# Patient Record
Sex: FEMALE | Race: ASIAN | NOT HISPANIC OR LATINO
[De-identification: names, ages, dates, MRNs, and addresses within clinical notes are randomized per-mention and may not be internally consistent; named-entity substitution may affect disease eponyms.]

---

## 2017-06-14 ENCOUNTER — RESULT REVIEW (OUTPATIENT)
Age: 62
End: 2017-06-14

## 2017-11-09 ENCOUNTER — OUTPATIENT (OUTPATIENT)
Dept: OUTPATIENT SERVICES | Facility: HOSPITAL | Age: 62
LOS: 1 days | End: 2017-11-09
Payer: COMMERCIAL

## 2017-11-09 VITALS
HEIGHT: 66 IN | TEMPERATURE: 98 F | HEART RATE: 58 BPM | WEIGHT: 139.99 LBS | OXYGEN SATURATION: 100 % | RESPIRATION RATE: 16 BRPM | DIASTOLIC BLOOD PRESSURE: 74 MMHG | SYSTOLIC BLOOD PRESSURE: 115 MMHG

## 2017-11-09 DIAGNOSIS — Z98.890 OTHER SPECIFIED POSTPROCEDURAL STATES: Chronic | ICD-10-CM

## 2017-11-09 DIAGNOSIS — Z01.818 ENCOUNTER FOR OTHER PREPROCEDURAL EXAMINATION: ICD-10-CM

## 2017-11-09 DIAGNOSIS — Z90.49 ACQUIRED ABSENCE OF OTHER SPECIFIED PARTS OF DIGESTIVE TRACT: Chronic | ICD-10-CM

## 2017-11-09 DIAGNOSIS — M25.562 PAIN IN LEFT KNEE: ICD-10-CM

## 2017-11-09 PROCEDURE — G0463: CPT

## 2017-11-09 NOTE — H&P PST ADULT - FAMILY HISTORY
Father  Still living? Unknown  Family history of pulmonary fibrosis, Age at diagnosis: Age Unknown  Family history of brain aneurysm, Age at diagnosis: Age Unknown     Mother  Still living? Unknown  Family history of hypertension, Age at diagnosis: Age Unknown  Family history of stroke, Age at diagnosis: Age Unknown  Family history of diabetes mellitus, Age at diagnosis: Age Unknown     Sibling  Still living? Unknown  Family history of breast cancer, Age at diagnosis: Age Unknown     Grandparent  Still living? Unknown  Family history of breast cancer, Age at diagnosis: Age Unknown

## 2017-11-09 NOTE — H&P PST ADULT - NSANTHOSAYNRD_GEN_A_CORE
No. YULIANA screening performed.  STOP BANG Legend: 0-2 = LOW Risk; 3-4 = INTERMEDIATE Risk; 5-8 = HIGH Risk

## 2017-11-09 NOTE — H&P PST ADULT - HISTORY OF PRESENT ILLNESS
62year old female with pmhx of appendicitis, hypertension, hyperlipidemia and rheumatoid arthritis presents with c/o pain to left knee x 2months that has increased in intensity. Patient attributes worsening pain to wear/tear and rheumatoid arthritis. Pain is rated an 8/10 with activity and 5/10 with rest. Patient is here today for presurgical testing for scheduled left knee arthroscopy on 11/17/17

## 2017-11-16 ENCOUNTER — TRANSCRIPTION ENCOUNTER (OUTPATIENT)
Age: 62
End: 2017-11-16

## 2017-11-17 ENCOUNTER — RESULT REVIEW (OUTPATIENT)
Age: 62
End: 2017-11-17

## 2017-11-17 ENCOUNTER — OUTPATIENT (OUTPATIENT)
Dept: OUTPATIENT SERVICES | Facility: HOSPITAL | Age: 62
LOS: 1 days | End: 2017-11-17
Payer: COMMERCIAL

## 2017-11-17 VITALS
TEMPERATURE: 98 F | DIASTOLIC BLOOD PRESSURE: 61 MMHG | OXYGEN SATURATION: 99 % | RESPIRATION RATE: 16 BRPM | HEART RATE: 71 BPM | SYSTOLIC BLOOD PRESSURE: 110 MMHG

## 2017-11-17 VITALS
WEIGHT: 139.99 LBS | OXYGEN SATURATION: 99 % | HEIGHT: 66 IN | RESPIRATION RATE: 18 BRPM | HEART RATE: 64 BPM | TEMPERATURE: 98 F | SYSTOLIC BLOOD PRESSURE: 110 MMHG | DIASTOLIC BLOOD PRESSURE: 78 MMHG

## 2017-11-17 DIAGNOSIS — Z90.49 ACQUIRED ABSENCE OF OTHER SPECIFIED PARTS OF DIGESTIVE TRACT: Chronic | ICD-10-CM

## 2017-11-17 DIAGNOSIS — M25.562 PAIN IN LEFT KNEE: ICD-10-CM

## 2017-11-17 DIAGNOSIS — Z98.890 OTHER SPECIFIED POSTPROCEDURAL STATES: Chronic | ICD-10-CM

## 2017-11-17 PROCEDURE — 29886 ARTHRS KNEE SURG DRLG OD LES: CPT | Mod: LT

## 2017-11-17 PROCEDURE — 29880 ARTHRS KNE SRG MNISECTMY M&L: CPT | Mod: LT

## 2017-11-17 PROCEDURE — 88304 TISSUE EXAM BY PATHOLOGIST: CPT | Mod: 26

## 2017-11-17 PROCEDURE — 88304 TISSUE EXAM BY PATHOLOGIST: CPT

## 2017-11-17 RX ORDER — ONDANSETRON 8 MG/1
4 TABLET, FILM COATED ORAL ONCE
Refills: 0 | Status: DISCONTINUED | OUTPATIENT
Start: 2017-11-17 | End: 2017-11-25

## 2017-11-17 RX ORDER — SODIUM CHLORIDE 9 MG/ML
3 INJECTION INTRAMUSCULAR; INTRAVENOUS; SUBCUTANEOUS EVERY 8 HOURS
Refills: 0 | Status: DISCONTINUED | OUTPATIENT
Start: 2017-11-17 | End: 2017-11-25

## 2017-11-17 RX ORDER — SODIUM CHLORIDE 9 MG/ML
1000 INJECTION, SOLUTION INTRAVENOUS
Refills: 0 | Status: DISCONTINUED | OUTPATIENT
Start: 2017-11-17 | End: 2017-11-20

## 2017-11-17 RX ORDER — SODIUM CHLORIDE 9 MG/ML
1000 INJECTION, SOLUTION INTRAVENOUS
Refills: 0 | Status: DISCONTINUED | OUTPATIENT
Start: 2017-11-17 | End: 2017-11-25

## 2017-11-17 RX ORDER — FENTANYL CITRATE 50 UG/ML
25 INJECTION INTRAVENOUS
Refills: 0 | Status: DISCONTINUED | OUTPATIENT
Start: 2017-11-17 | End: 2017-11-20

## 2017-11-17 RX ORDER — TRAMADOL HYDROCHLORIDE 50 MG/1
1 TABLET ORAL
Qty: 40 | Refills: 0
Start: 2017-11-17

## 2017-11-17 RX ORDER — TRAMADOL HYDROCHLORIDE 50 MG/1
50 TABLET ORAL EVERY 6 HOURS
Refills: 0 | Status: DISCONTINUED | OUTPATIENT
Start: 2017-11-17 | End: 2017-11-17

## 2017-11-17 RX ORDER — ONDANSETRON 8 MG/1
4 TABLET, FILM COATED ORAL ONCE
Refills: 0 | Status: COMPLETED | OUTPATIENT
Start: 2017-11-17 | End: 2017-11-17

## 2017-11-17 RX ADMIN — FENTANYL CITRATE 25 MICROGRAM(S): 50 INJECTION INTRAVENOUS at 20:00

## 2017-11-17 RX ADMIN — FENTANYL CITRATE 25 MICROGRAM(S): 50 INJECTION INTRAVENOUS at 19:36

## 2017-11-17 RX ADMIN — ONDANSETRON 4 MILLIGRAM(S): 8 TABLET, FILM COATED ORAL at 19:36

## 2017-11-17 NOTE — BRIEF OPERATIVE NOTE - PROCEDURE
<<-----Click on this checkbox to enter Procedure Knee arthroscopy, left  11/17/2017    Active  [id number]

## 2017-11-17 NOTE — ASU DISCHARGE PLAN (ADULT/PEDIATRIC). - MEDICATION SUMMARY - MEDICATIONS TO TAKE
I will START or STAY ON the medications listed below when I get home from the hospital:    traMADol 50 mg oral tablet  -- 1 tab(s) by mouth every 6 hours, As needed, for  Pain MDD:4   -- Indication: For Left knee pain

## 2017-11-24 LAB — SURGICAL PATHOLOGY FINAL REPORT - CH: SIGNIFICANT CHANGE UP

## 2020-01-29 NOTE — ASU PREOP CHECKLIST - LATEX ALLERGY
1. Continue with your good diet.   2. Continue with present meds.   3. Get back to exercising as your foot improves.   4. Try to avoid sweets all the time.   5. Return in mid to late July for repeat fasting blood work and follow up.     
no

## 2021-08-11 NOTE — H&P PST ADULT - NEGATIVE PSYCHIATRIC SYMPTOMS
Problem: Potential for injury, Restraints  Goal: # Remains free from injury  Outcome: Outcome Not Met, Continue to Monitor  Goal: Verbalizes criteria for restraint discontinuation  Description: Document on Patient Education Activity  Outcome: Outcome Not Met, Continue to Monitor     Problem: Pain  Goal: #Acceptable pain level achieved/maintained at rest using NRS/Faces  Description: This goal is used for patients who can self-report.  Acceptable means the level is at or below the identified comfort/function goal.  Outcome: Outcome Not Met, Continue to Monitor  Goal: # Acceptable pain level achieved/maintained at rest using NRS/Faces without oversedation (opioid naive or PCA/Epidural infusion)  Description: This goal is used if Opioid-naïve or on PCA/Epidural Infusion.  Outcome: Outcome Not Met, Continue to Monitor  Goal: # Acceptable pain level achieved/maintained with activity using NRS/Faces  Description: This goal is used for patients who can self-report and are not achieving acceptable pain control during activity.  Outcome: Outcome Not Met, Continue to Monitor  Goal: Acceptable pain/comfort level is achieved/maintained at rest (based on Pain Behaviors Scale)  Description: This goal is used for patients who are not able to self-report pain and are assessed for pain using the Pain Behaviors Scale  Outcome: Outcome Not Met, Continue to Monitor  Goal: Acceptable pain/comfort level is achieved/maintained at rest based on PAINAID scale (Dementia)  Description: This goal is used for patients who are not able to self-report pain, have dementia, and assessed using the PAINAD scale.  Outcome: Outcome Not Met, Continue to Monitor  Goal: Acceptable pain/comfort level is achieved/maintained at rest (based on pediatric behavior tool: NIPS, NPASS, or FLACC)  Description: This goal is used for pediatric patients who are not able to self report pain.  Outcome: Outcome Not Met, Continue to Monitor  Goal: # Verbalizes  understanding of pain management  Description: Documented in Patient Education Activity  Outcome: Outcome Not Met, Continue to Monitor  Goal: Verbalizes understanding and effective use of Patient Controlled Analgesia (PCA)  Description: Documented in Patient Education Activity  This goal is used for patients with PCA  Outcome: Outcome Not Met, Continue to Monitor  Goal: Maximum comfort achieved/maintained at end of life (Hospice)  Outcome: Outcome Not Met, Continue to Monitor     Problem: At Risk for Falls  Goal: # Patient does not fall  Outcome: Outcome Not Met, Continue to Monitor  Goal: # Takes action to control fall-related risks  Outcome: Outcome Not Met, Continue to Monitor  Goal: # Verbalizes understanding of fall risk/precautions  Description: Document education using the patient education activity  Outcome: Outcome Not Met, Continue to Monitor     Problem: At Risk for Injury Due to Fall  Goal: # Patient does not fall  Outcome: Outcome Not Met, Continue to Monitor  Goal: # Takes action to control condition specific risks  Outcome: Outcome Not Met, Continue to Monitor  Goal: # Verbalizes understanding of fall-related injury personal risks  Description: Document education using the patient education activity  Outcome: Outcome Not Met, Continue to Monitor     Problem: Artificial Airway Management  Goal: # Maintains effective artificial airway  Outcome: Outcome Not Met, Continue to Monitor  Goal: # Maintains skin integrity around the airway  Outcome: Outcome Not Met, Continue to Monitor  Goal: # Tolerates Activity/ADL without s/s of intolerance  Description: Monitor patient tolerance of the artificial airway while they perform activities and ADLs include bathing, showering, shaving, and eating.  Outcome: Outcome Not Met, Continue to Monitor  Goal: Verbalizes understanding of artifical airway function and care  Description: Document on Patient Education Activity  Outcome: Outcome Not Met, Continue to  Monitor  Goal: Demonstrates ability to manage Trach: site care, suctioning, trach barajas care & inner cannula care if needed.  Description: Document on Patient Education Activity    Outcome: Outcome Not Met, Continue to Monitor  Goal: Demonstrates ability to perform Trach cuff maintenance  Description: Document on Patient Education Activity    Outcome: Outcome Not Met, Continue to Monitor  Goal: Demonstrates ability to manage Laryngectomy: stoma care, suctioning, and humidification  Description: Document on Patient Education Activity  Outcome: Outcome Not Met, Continue to Monitor  Goal: Demonstrates ability to manage communication (speaking valve or cork insertion)  Description: Document on Patient Education Activity  Outcome: Outcome Not Met, Continue to Monitor     Problem: Pressure Injury, Risk for  Goal: # Skin remains intact  Outcome: Outcome Not Met, Continue to Monitor  Goal: No new pressure injury (PI) development  Outcome: Outcome Not Met, Continue to Monitor  Goal: # Verbalizes understanding of PI risk factors and prevention strategies  Description: Document education using the patient education activity.   Outcome: Outcome Not Met, Continue to Monitor  Goal: Comfort optimized with pressure injury prevention strategies guided by patient/family preference. (Hospice)  Outcome: Outcome Not Met, Continue to Monitor      no suicidal ideation/no depression/no anxiety/no insomnia/no memory loss

## 2022-04-20 ENCOUNTER — RESULT REVIEW (OUTPATIENT)
Age: 67
End: 2022-04-20

## 2022-04-25 RX ORDER — FOLIC ACID 0.8 MG
1 TABLET ORAL
Qty: 0 | Refills: 0 | DISCHARGE

## 2022-04-25 RX ORDER — ROSUVASTATIN CALCIUM 5 MG/1
1 TABLET ORAL
Qty: 0 | Refills: 0 | DISCHARGE

## 2022-04-25 RX ORDER — METHOTREXATE 2.5 MG/1
0 TABLET ORAL
Qty: 0 | Refills: 0 | DISCHARGE

## 2022-04-25 RX ORDER — HYDROXYCHLOROQUINE SULFATE 200 MG
1 TABLET ORAL
Qty: 0 | Refills: 0 | DISCHARGE

## 2022-04-25 RX ORDER — AMLODIPINE BESYLATE 2.5 MG/1
1 TABLET ORAL
Qty: 0 | Refills: 0 | DISCHARGE

## 2022-04-25 RX ORDER — ASPIRIN/CALCIUM CARB/MAGNESIUM 324 MG
1 TABLET ORAL
Qty: 0 | Refills: 0 | DISCHARGE

## 2022-05-12 PROBLEM — E78.5 HYPERLIPIDEMIA, UNSPECIFIED: Chronic | Status: ACTIVE | Noted: 2017-11-09

## 2022-05-12 PROBLEM — I10 ESSENTIAL (PRIMARY) HYPERTENSION: Chronic | Status: ACTIVE | Noted: 2017-11-09

## 2022-05-12 PROBLEM — M06.9 RHEUMATOID ARTHRITIS, UNSPECIFIED: Chronic | Status: ACTIVE | Noted: 2017-11-09

## 2022-05-12 PROBLEM — K37 UNSPECIFIED APPENDICITIS: Chronic | Status: ACTIVE | Noted: 2017-11-09

## 2022-05-18 ENCOUNTER — OUTPATIENT (OUTPATIENT)
Dept: OUTPATIENT SERVICES | Facility: HOSPITAL | Age: 67
LOS: 1 days | End: 2022-05-18
Payer: MEDICARE

## 2022-05-18 VITALS
DIASTOLIC BLOOD PRESSURE: 62 MMHG | RESPIRATION RATE: 16 BRPM | TEMPERATURE: 97 F | SYSTOLIC BLOOD PRESSURE: 116 MMHG | HEART RATE: 56 BPM | HEIGHT: 64 IN | OXYGEN SATURATION: 100 % | WEIGHT: 135.58 LBS

## 2022-05-18 DIAGNOSIS — D05.12 INTRADUCTAL CARCINOMA IN SITU OF LEFT BREAST: ICD-10-CM

## 2022-05-18 DIAGNOSIS — Z98.890 OTHER SPECIFIED POSTPROCEDURAL STATES: Chronic | ICD-10-CM

## 2022-05-18 DIAGNOSIS — Z90.49 ACQUIRED ABSENCE OF OTHER SPECIFIED PARTS OF DIGESTIVE TRACT: Chronic | ICD-10-CM

## 2022-05-18 DIAGNOSIS — R92.2 INCONCLUSIVE MAMMOGRAM: ICD-10-CM

## 2022-05-18 DIAGNOSIS — Z01.818 ENCOUNTER FOR OTHER PREPROCEDURAL EXAMINATION: ICD-10-CM

## 2022-05-18 DIAGNOSIS — Z80.3 FAMILY HISTORY OF MALIGNANT NEOPLASM OF BREAST: ICD-10-CM

## 2022-05-18 DIAGNOSIS — Z96.652 PRESENCE OF LEFT ARTIFICIAL KNEE JOINT: Chronic | ICD-10-CM

## 2022-05-18 PROCEDURE — 36415 COLL VENOUS BLD VENIPUNCTURE: CPT

## 2022-05-18 PROCEDURE — 88321 CONSLTJ&REPRT SLD PREP ELSWR: CPT

## 2022-05-18 PROCEDURE — G0463: CPT

## 2022-05-18 RX ORDER — PANTOPRAZOLE SODIUM 20 MG/1
1 TABLET, DELAYED RELEASE ORAL
Qty: 0 | Refills: 0 | DISCHARGE

## 2022-05-18 NOTE — H&P PST ADULT - NSICDXPASTMEDICALHX_GEN_ALL_CORE_FT
PAST MEDICAL HISTORY:  Anemia     Appendicitis     Breast cyst, left     GERD (gastroesophageal reflux disease)     Hyperlipidemia     Hypertension     Intraductal carcinoma in situ of left breast     Rheumatoid arthritis

## 2022-05-18 NOTE — H&P PST ADULT - NSICDXFAMILYHX_GEN_ALL_CORE_FT
FAMILY HISTORY:  Father  Still living? Unknown  Family history of brain aneurysm, Age at diagnosis: Age Unknown  Family history of pulmonary fibrosis, Age at diagnosis: Age Unknown    Mother  Still living? Unknown  Family history of diabetes mellitus, Age at diagnosis: Age Unknown  Family history of hypertension, Age at diagnosis: Age Unknown  Family history of stroke, Age at diagnosis: Age Unknown    Sibling  Still living? Unknown  Family history of breast cancer, Age at diagnosis: Age Unknown    Grandparent  Still living? Unknown  Family history of breast cancer, Age at diagnosis: Age Unknown

## 2022-05-18 NOTE — H&P PST ADULT - FALL HARM RISK - UNIVERSAL INTERVENTIONS
Bed in lowest position, wheels locked, appropriate side rails in place/Call bell, personal items and telephone in reach/Instruct patient to call for assistance before getting out of bed or chair/Non-slip footwear when patient is out of bed/[place] to call system/Physically safe environment - no spills, clutter or unnecessary equipment/Purposeful Proactive Rounding/Room/bathroom lighting operational, light cord in reach

## 2022-05-18 NOTE — H&P PST ADULT - NSICDXPASTSURGICALHX_GEN_ALL_CORE_FT
PAST SURGICAL HISTORY:  H/O left breast biopsy     History of appendectomy     Total knee replacement status, left 2019

## 2022-05-18 NOTE — H&P PST ADULT - PROBLEM SELECTOR PLAN 1
Patient schedule for left breast wide excision. Covid, EKG done by PCP and medical clearance pending,

## 2022-05-18 NOTE — H&P PST ADULT - HISTORY OF PRESENT ILLNESS
65 y/o female presents for PST. Per patient she had routine mammo, sonogram and MRI done in 04/2022 and abnormality was not left breast. Per patient biopsy done and resulted intraductal carcinoma in situ of the left breast. Patient denies any hx of covid and stated that she is vaccinated.

## 2022-05-19 PROBLEM — D05.12 INTRADUCTAL CARCINOMA IN SITU OF LEFT BREAST: Chronic | Status: ACTIVE | Noted: 2022-05-18

## 2022-05-19 PROBLEM — D64.9 ANEMIA, UNSPECIFIED: Chronic | Status: ACTIVE | Noted: 2022-05-18

## 2022-05-19 PROBLEM — K21.9 GASTRO-ESOPHAGEAL REFLUX DISEASE WITHOUT ESOPHAGITIS: Chronic | Status: ACTIVE | Noted: 2022-05-18

## 2022-05-20 ENCOUNTER — OUTPATIENT (OUTPATIENT)
Dept: OUTPATIENT SERVICES | Facility: HOSPITAL | Age: 67
LOS: 1 days | End: 2022-05-20
Payer: MEDICARE

## 2022-05-20 ENCOUNTER — APPOINTMENT (OUTPATIENT)
Dept: MAMMOGRAPHY | Facility: IMAGING CENTER | Age: 67
End: 2022-05-20
Payer: MEDICARE

## 2022-05-20 DIAGNOSIS — Z96.652 PRESENCE OF LEFT ARTIFICIAL KNEE JOINT: Chronic | ICD-10-CM

## 2022-05-20 DIAGNOSIS — Z90.49 ACQUIRED ABSENCE OF OTHER SPECIFIED PARTS OF DIGESTIVE TRACT: Chronic | ICD-10-CM

## 2022-05-20 DIAGNOSIS — Z98.890 OTHER SPECIFIED POSTPROCEDURAL STATES: Chronic | ICD-10-CM

## 2022-05-20 DIAGNOSIS — Z00.8 ENCOUNTER FOR OTHER GENERAL EXAMINATION: ICD-10-CM

## 2022-05-20 PROCEDURE — C1739: CPT

## 2022-05-20 PROCEDURE — 19281 PERQ DEVICE BREAST 1ST IMAG: CPT | Mod: LT

## 2022-05-20 PROCEDURE — 19281 PERQ DEVICE BREAST 1ST IMAG: CPT

## 2022-05-23 LAB — SURGICAL PATHOLOGY STUDY: SIGNIFICANT CHANGE UP

## 2022-05-24 ENCOUNTER — TRANSCRIPTION ENCOUNTER (OUTPATIENT)
Age: 67
End: 2022-05-24

## 2022-05-25 ENCOUNTER — OUTPATIENT (OUTPATIENT)
Dept: OUTPATIENT SERVICES | Facility: HOSPITAL | Age: 67
LOS: 1 days | End: 2022-05-25
Payer: MEDICARE

## 2022-05-25 ENCOUNTER — RESULT REVIEW (OUTPATIENT)
Age: 67
End: 2022-05-25

## 2022-05-25 ENCOUNTER — TRANSCRIPTION ENCOUNTER (OUTPATIENT)
Age: 67
End: 2022-05-25

## 2022-05-25 VITALS
DIASTOLIC BLOOD PRESSURE: 68 MMHG | WEIGHT: 135.58 LBS | HEART RATE: 50 BPM | RESPIRATION RATE: 15 BRPM | OXYGEN SATURATION: 100 % | SYSTOLIC BLOOD PRESSURE: 119 MMHG | TEMPERATURE: 98 F | HEIGHT: 64 IN

## 2022-05-25 VITALS
RESPIRATION RATE: 18 BRPM | DIASTOLIC BLOOD PRESSURE: 54 MMHG | OXYGEN SATURATION: 99 % | SYSTOLIC BLOOD PRESSURE: 100 MMHG | TEMPERATURE: 97 F | HEART RATE: 61 BPM

## 2022-05-25 DIAGNOSIS — Z96.652 PRESENCE OF LEFT ARTIFICIAL KNEE JOINT: Chronic | ICD-10-CM

## 2022-05-25 DIAGNOSIS — D05.12 INTRADUCTAL CARCINOMA IN SITU OF LEFT BREAST: ICD-10-CM

## 2022-05-25 DIAGNOSIS — Z98.890 OTHER SPECIFIED POSTPROCEDURAL STATES: Chronic | ICD-10-CM

## 2022-05-25 DIAGNOSIS — Z90.49 ACQUIRED ABSENCE OF OTHER SPECIFIED PARTS OF DIGESTIVE TRACT: Chronic | ICD-10-CM

## 2022-05-25 DIAGNOSIS — Z80.3 FAMILY HISTORY OF MALIGNANT NEOPLASM OF BREAST: ICD-10-CM

## 2022-05-25 DIAGNOSIS — R92.2 INCONCLUSIVE MAMMOGRAM: ICD-10-CM

## 2022-05-25 PROCEDURE — 88307 TISSUE EXAM BY PATHOLOGIST: CPT

## 2022-05-25 PROCEDURE — 38900 IO MAP OF SENT LYMPH NODE: CPT | Mod: LT

## 2022-05-25 PROCEDURE — 88333 PATH CONSLTJ SURG CYTO XM 1: CPT

## 2022-05-25 PROCEDURE — 19301 PARTIAL MASTECTOMY: CPT | Mod: LT

## 2022-05-25 PROCEDURE — 88305 TISSUE EXAM BY PATHOLOGIST: CPT

## 2022-05-25 PROCEDURE — 88333 PATH CONSLTJ SURG CYTO XM 1: CPT | Mod: 26,59

## 2022-05-25 PROCEDURE — 76098 X-RAY EXAM SURGICAL SPECIMEN: CPT

## 2022-05-25 PROCEDURE — 88305 TISSUE EXAM BY PATHOLOGIST: CPT | Mod: 26

## 2022-05-25 PROCEDURE — 76098 X-RAY EXAM SURGICAL SPECIMEN: CPT | Mod: 26

## 2022-05-25 PROCEDURE — 38500 BIOPSY/REMOVAL LYMPH NODES: CPT | Mod: LT

## 2022-05-25 PROCEDURE — 88307 TISSUE EXAM BY PATHOLOGIST: CPT | Mod: 26

## 2022-05-25 RX ORDER — SODIUM CHLORIDE 9 MG/ML
1000 INJECTION, SOLUTION INTRAVENOUS
Refills: 0 | Status: DISCONTINUED | OUTPATIENT
Start: 2022-05-25 | End: 2022-05-25

## 2022-05-25 RX ORDER — IBUPROFEN 200 MG
2 TABLET ORAL
Qty: 0 | Refills: 0 | DISCHARGE

## 2022-05-25 RX ORDER — FENTANYL CITRATE 50 UG/ML
12.5 INJECTION INTRAVENOUS ONCE
Refills: 0 | Status: DISCONTINUED | OUTPATIENT
Start: 2022-05-25 | End: 2022-05-25

## 2022-05-25 RX ORDER — ONDANSETRON 8 MG/1
4 TABLET, FILM COATED ORAL ONCE
Refills: 0 | Status: DISCONTINUED | OUTPATIENT
Start: 2022-05-25 | End: 2022-05-25

## 2022-05-25 RX ORDER — TRAMADOL HYDROCHLORIDE 50 MG/1
50 TABLET ORAL ONCE
Refills: 0 | Status: DISCONTINUED | OUTPATIENT
Start: 2022-05-25 | End: 2022-05-25

## 2022-05-25 RX ORDER — FENTANYL CITRATE 50 UG/ML
25 INJECTION INTRAVENOUS ONCE
Refills: 0 | Status: DISCONTINUED | OUTPATIENT
Start: 2022-05-25 | End: 2022-05-25

## 2022-05-25 RX ADMIN — SODIUM CHLORIDE 50 MILLILITER(S): 9 INJECTION, SOLUTION INTRAVENOUS at 07:06

## 2022-05-25 NOTE — BRIEF OPERATIVE NOTE - NSICDXBRIEFPREOP_GEN_ALL_CORE_FT
PRE-OP DIAGNOSIS:  Ductal carcinoma in situ (DCIS) of left breast 25-May-2022 10:08:56  Lilibeth Jackman

## 2022-05-25 NOTE — ASU PATIENT PROFILE, ADULT - FALL HARM RISK - UNIVERSAL INTERVENTIONS
Bed in lowest position, wheels locked, appropriate side rails in place/Call bell, personal items and telephone in reach/Instruct patient to call for assistance before getting out of bed or chair/Non-slip footwear when patient is out of bed/[place] to call system/Physically safe environment - no spills, clutter or unnecessary equipment/Purposeful Proactive Rounding/Room/bathroom lighting operational, light cord in reach

## 2022-05-25 NOTE — BRIEF OPERATIVE NOTE - NSICDXBRIEFPROCEDURE_GEN_ALL_CORE_FT
PROCEDURES:  Open resection of left breast 25-May-2022 10:11:31 kervin  guided/left axillary sentinel node bx using blue dye technique Lilibeth Jackman

## 2022-05-25 NOTE — ASU DISCHARGE PLAN (ADULT/PEDIATRIC) - NS MD DC FALL RISK RISK
For information on Fall & Injury Prevention, visit: [email or web address] OR  [email or web address] OR  https://www.cdc.gov/steadi/patient.html

## 2022-05-25 NOTE — BRIEF OPERATIVE NOTE - NSICDXBRIEFPOSTOP_GEN_ALL_CORE_FT
POST-OP DIAGNOSIS:  Ductal carcinoma in situ (DCIS) of left breast 25-May-2022 10:09:12  Lilibeth Jackman  Ductal carcinoma in situ (DCIS) of left breast 25-May-2022 10:09:34  Lilibeth Jackman

## 2022-05-25 NOTE — ASU DISCHARGE PLAN (ADULT/PEDIATRIC) - ASU DC SPECIAL INSTRUCTIONSFT
Keep dressing dry and intact for 48 hours then remove outer dressing and may shower   ice pack to left breast as needed   Wear bra day and night for 2 weeks   Take pain medication prescribed by Dr Bell as needed   If pain mild , take tylenol only   Follow up with Dr Bell in 2 weeks , call office for appt.

## 2022-06-02 LAB — SURGICAL PATHOLOGY STUDY: SIGNIFICANT CHANGE UP

## 2022-06-14 ENCOUNTER — OUTPATIENT (OUTPATIENT)
Dept: OUTPATIENT SERVICES | Facility: HOSPITAL | Age: 67
LOS: 1 days | End: 2022-06-14
Payer: MEDICARE

## 2022-06-14 VITALS
RESPIRATION RATE: 14 BRPM | HEART RATE: 60 BPM | WEIGHT: 132.72 LBS | SYSTOLIC BLOOD PRESSURE: 144 MMHG | TEMPERATURE: 98 F | DIASTOLIC BLOOD PRESSURE: 71 MMHG | HEIGHT: 65 IN | OXYGEN SATURATION: 100 %

## 2022-06-14 DIAGNOSIS — I10 ESSENTIAL (PRIMARY) HYPERTENSION: ICD-10-CM

## 2022-06-14 DIAGNOSIS — D05.12 INTRADUCTAL CARCINOMA IN SITU OF LEFT BREAST: ICD-10-CM

## 2022-06-14 DIAGNOSIS — Z90.49 ACQUIRED ABSENCE OF OTHER SPECIFIED PARTS OF DIGESTIVE TRACT: Chronic | ICD-10-CM

## 2022-06-14 DIAGNOSIS — Z98.890 OTHER SPECIFIED POSTPROCEDURAL STATES: Chronic | ICD-10-CM

## 2022-06-14 DIAGNOSIS — M06.9 RHEUMATOID ARTHRITIS, UNSPECIFIED: ICD-10-CM

## 2022-06-14 DIAGNOSIS — Z01.818 ENCOUNTER FOR OTHER PREPROCEDURAL EXAMINATION: ICD-10-CM

## 2022-06-14 DIAGNOSIS — E78.5 HYPERLIPIDEMIA, UNSPECIFIED: ICD-10-CM

## 2022-06-14 DIAGNOSIS — Z96.652 PRESENCE OF LEFT ARTIFICIAL KNEE JOINT: Chronic | ICD-10-CM

## 2022-06-14 LAB — SARS-COV-2 RNA SPEC QL NAA+PROBE: SIGNIFICANT CHANGE UP

## 2022-06-14 PROCEDURE — U0003: CPT

## 2022-06-14 PROCEDURE — G0463: CPT

## 2022-06-14 PROCEDURE — U0005: CPT

## 2022-06-14 NOTE — H&P PST ADULT - MUSCULOSKELETAL
details… normal/ROM intact/normal gait/strength 5/5 bilateral upper extremities/strength 5/5 bilateral lower extremities

## 2022-06-14 NOTE — H&P PST ADULT - PROBLEM SELECTOR PLAN 1
Pre-op instructions given. Pt verbalized understanding  Chlorhexidine wash instructions given  M/C, labs +l ekg in chart   Pending: Covidpcr results, test done today

## 2022-06-14 NOTE — H&P PST ADULT - NSANTHOSAYNRD_GEN_A_CORE
neck 14inches/No. YULIANA screening performed.  STOP BANG Legend: 0-2 = LOW Risk; 3-4 = INTERMEDIATE Risk; 5-8 = HIGH Risk

## 2022-06-14 NOTE — H&P PST ADULT - NSICDXPASTSURGICALHX_GEN_ALL_CORE_FT
PAST SURGICAL HISTORY:  H/O left breast biopsy     History of appendectomy     S/P lumpectomy, left breast 5/25/2022    Total knee replacement status, left 2019

## 2022-06-14 NOTE — H&P PST ADULT - HISTORY OF PRESENT ILLNESS
65 y/o female with medical h/o HTN, HDL and RA, reports h/o Left breast Intraductal carcinoma, left breast, and underwent Left breast lumpectomy on 5/25/2022, she reports s/p surgery biopsy indicate new findings and further procedure was recommended. Pt presents today for PST + Covidpcr test for scheduled Left Breast Wide Resection on 6/17/2022

## 2022-06-14 NOTE — H&P PST ADULT - HAS THE PATIENT HAD A SIGNIFICANT CHANGE IN FUNCTIONAL STATUS DUE TO CVA, HEAD TRAUMA, ORTHOPEDIC TRAUMA/SURGERY, OR FALL, WITH THE WEEK PRIOR TO ADMISSION
[address] [facility]                 [address] [facility], [address]                          ELECTROENCEPHALOGRAM REPORT    PATIENT NAME: Roverto Romo                    :        1980  MED REC NO:   [id number]                              ROOM:  ACCOUNT NO:   [de-identified]                           ADMIT DATE: 2021  PROVIDER:     Karina Shields    DATE OF EE2021    TECHNICIAN:  Paola Mooney. HISTORY:  This is a 68-year-old female with episode of unresponsiveness. She was seen in ED on 2021 after having generalized tonic-clonic  seizure. She had a history of migraine headaches and head trauma at age  15. Family history of cerebral aneurysm. MEDICATIONS:  Include Zyrtec, Pepcid, Maxalt, vitamin D.    DESCRIPTION OF PROCEDURE:  Electrodes were applied using paste in  positions dictated by the international 10-20 system of placement. Reviewing montages included both referential and bipolar derivations. In addition to EEG data, EKG and eye movements were recorded. This is a  routine recording. This test was performed on 2021. DESCRIPTION OF ACTIVITIES:  At the onset of the study, the patient is  drowsy with background demonstrating 8 Hz 30-40 microvolt symmetric  posterior alpha rhythm which attenuated symmetrically with eye opening. Medium voltage theta activity noted occurring diffusely as the study  progresses. Photic stimulation did not induce posterior driving  responses. Hyperventilation is not performed. Electrocardiogram  montage revealed normal sinus rhythm. During drowsiness left mid  temporal slowing noted. Vertex sharp waves and K-complexes are noted  occurring symmetrically. Isolated sharp wave discharge noted with left  mid temporal slowing. This did not evolve into rhythmic electrographic  seizure activity.     ELECTRODIAGNOSTIC INTERPRETATION:  This is an abnormal EEG with isolated  sharp wave activity noted in left mid temporal region associated with  left temporal slowing. Associated muscle artifacts makes it  questionable. Clinical correlation is recommended.         Johanna Loyola    D: 04/08/2021 14:09:13       T: 04/08/2021 14:18:14     [email or web address]  Job#: [id number]     Doc#: [id number]    CC: no

## 2022-06-16 ENCOUNTER — TRANSCRIPTION ENCOUNTER (OUTPATIENT)
Age: 67
End: 2022-06-16

## 2022-06-17 ENCOUNTER — TRANSCRIPTION ENCOUNTER (OUTPATIENT)
Age: 67
End: 2022-06-17

## 2022-06-17 ENCOUNTER — OUTPATIENT (OUTPATIENT)
Dept: OUTPATIENT SERVICES | Facility: HOSPITAL | Age: 67
LOS: 1 days | End: 2022-06-17
Payer: MEDICARE

## 2022-06-17 ENCOUNTER — RESULT REVIEW (OUTPATIENT)
Age: 67
End: 2022-06-17

## 2022-06-17 VITALS
RESPIRATION RATE: 14 BRPM | TEMPERATURE: 99 F | DIASTOLIC BLOOD PRESSURE: 76 MMHG | OXYGEN SATURATION: 99 % | HEIGHT: 65 IN | WEIGHT: 132.72 LBS | HEART RATE: 52 BPM | SYSTOLIC BLOOD PRESSURE: 144 MMHG

## 2022-06-17 VITALS
TEMPERATURE: 97 F | HEART RATE: 63 BPM | DIASTOLIC BLOOD PRESSURE: 69 MMHG | SYSTOLIC BLOOD PRESSURE: 136 MMHG | OXYGEN SATURATION: 100 % | RESPIRATION RATE: 16 BRPM

## 2022-06-17 DIAGNOSIS — Z90.49 ACQUIRED ABSENCE OF OTHER SPECIFIED PARTS OF DIGESTIVE TRACT: Chronic | ICD-10-CM

## 2022-06-17 DIAGNOSIS — Z98.890 OTHER SPECIFIED POSTPROCEDURAL STATES: Chronic | ICD-10-CM

## 2022-06-17 DIAGNOSIS — Z96.652 PRESENCE OF LEFT ARTIFICIAL KNEE JOINT: Chronic | ICD-10-CM

## 2022-06-17 DIAGNOSIS — D05.12 INTRADUCTAL CARCINOMA IN SITU OF LEFT BREAST: ICD-10-CM

## 2022-06-17 PROCEDURE — 88307 TISSUE EXAM BY PATHOLOGIST: CPT | Mod: 26

## 2022-06-17 PROCEDURE — 88305 TISSUE EXAM BY PATHOLOGIST: CPT | Mod: 26

## 2022-06-17 PROCEDURE — 88305 TISSUE EXAM BY PATHOLOGIST: CPT

## 2022-06-17 PROCEDURE — 19301 PARTIAL MASTECTOMY: CPT | Mod: LT

## 2022-06-17 PROCEDURE — 88307 TISSUE EXAM BY PATHOLOGIST: CPT

## 2022-06-17 RX ORDER — SODIUM CHLORIDE 9 MG/ML
1000 INJECTION, SOLUTION INTRAVENOUS
Refills: 0 | Status: DISCONTINUED | OUTPATIENT
Start: 2022-06-17 | End: 2022-06-17

## 2022-06-17 RX ORDER — ACETAMINOPHEN 500 MG
650 TABLET ORAL ONCE
Refills: 0 | Status: COMPLETED | OUTPATIENT
Start: 2022-06-17 | End: 2022-06-17

## 2022-06-17 RX ORDER — SCOPALAMINE 1 MG/3D
1 PATCH, EXTENDED RELEASE TRANSDERMAL ONCE
Refills: 0 | Status: COMPLETED | OUTPATIENT
Start: 2022-06-17 | End: 2022-06-17

## 2022-06-17 RX ORDER — ONDANSETRON 8 MG/1
4 TABLET, FILM COATED ORAL ONCE
Refills: 0 | Status: COMPLETED | OUTPATIENT
Start: 2022-06-17 | End: 2022-06-17

## 2022-06-17 RX ORDER — HYDROMORPHONE HYDROCHLORIDE 2 MG/ML
0.5 INJECTION INTRAMUSCULAR; INTRAVENOUS; SUBCUTANEOUS
Refills: 0 | Status: DISCONTINUED | OUTPATIENT
Start: 2022-06-17 | End: 2022-06-17

## 2022-06-17 RX ORDER — HYDROMORPHONE HYDROCHLORIDE 2 MG/ML
1 INJECTION INTRAMUSCULAR; INTRAVENOUS; SUBCUTANEOUS
Refills: 0 | Status: DISCONTINUED | OUTPATIENT
Start: 2022-06-17 | End: 2022-06-17

## 2022-06-17 RX ORDER — OXYCODONE HYDROCHLORIDE 5 MG/1
5 TABLET ORAL ONCE
Refills: 0 | Status: DISCONTINUED | OUTPATIENT
Start: 2022-06-17 | End: 2022-07-01

## 2022-06-17 RX ORDER — ONDANSETRON 8 MG/1
4 TABLET, FILM COATED ORAL ONCE
Refills: 0 | Status: DISCONTINUED | OUTPATIENT
Start: 2022-06-17 | End: 2022-06-17

## 2022-06-17 RX ORDER — ONDANSETRON 8 MG/1
4 TABLET, FILM COATED ORAL ONCE
Refills: 0 | Status: DISCONTINUED | OUTPATIENT
Start: 2022-06-17 | End: 2022-07-01

## 2022-06-17 RX ADMIN — Medication 12.5 MILLIGRAM(S): at 18:33

## 2022-06-17 RX ADMIN — SCOPALAMINE 1 PATCH: 1 PATCH, EXTENDED RELEASE TRANSDERMAL at 18:31

## 2022-06-17 RX ADMIN — HYDROMORPHONE HYDROCHLORIDE 0.5 MILLIGRAM(S): 2 INJECTION INTRAMUSCULAR; INTRAVENOUS; SUBCUTANEOUS at 15:11

## 2022-06-17 RX ADMIN — SODIUM CHLORIDE 50 MILLILITER(S): 9 INJECTION, SOLUTION INTRAVENOUS at 11:47

## 2022-06-17 RX ADMIN — Medication 650 MILLIGRAM(S): at 16:20

## 2022-06-17 RX ADMIN — HYDROMORPHONE HYDROCHLORIDE 0.5 MILLIGRAM(S): 2 INJECTION INTRAMUSCULAR; INTRAVENOUS; SUBCUTANEOUS at 14:58

## 2022-06-17 RX ADMIN — ONDANSETRON 4 MILLIGRAM(S): 8 TABLET, FILM COATED ORAL at 16:20

## 2022-06-17 RX ADMIN — Medication 650 MILLIGRAM(S): at 16:30

## 2022-06-17 NOTE — ASU PREOP CHECKLIST - MEDICAL/PEDIATRIC CLEARANCE ON MEDICAL RECORD
medical and cardiac clearance on chart, echocardiogram and nuclear medicine stress tests results on chart

## 2022-06-17 NOTE — ASU DISCHARGE PLAN (ADULT/PEDIATRIC) - CALL YOUR DOCTOR IF YOU HAVE ANY OF THE FOLLOWING:
Bleeding that does not stop/Swelling that gets worse/Pain not relieved by Medications/Fever greater than (need to indicate Fahrenheit or Celsius) Bleeding that does not stop/Swelling that gets worse/Pain not relieved by Medications/Fever greater than (need to indicate Fahrenheit or Celsius)/Nausea and vomiting that does not stop/Unable to urinate/Inability to tolerate liquids or foods

## 2022-06-17 NOTE — ASU PATIENT PROFILE, ADULT - FALL HARM RISK - UNIVERSAL INTERVENTIONS
Bed in lowest position, wheels locked, appropriate side rails in place/Call bell, personal items and telephone in reach/Instruct patient to call for assistance before getting out of bed or chair/Non-slip footwear when patient is out of bed/[place] to call system/Physically safe environment - no spills, clutter or unnecessary equipment/Purposeful Proactive Rounding/Room/bathroom lighting operational, light cord in reach

## 2022-06-17 NOTE — BRIEF OPERATIVE NOTE - NSICDXBRIEFPROCEDURE_GEN_ALL_CORE_FT
PROCEDURES:  Re-excision of lesion of breast 17-Jun-2022 15:25:25 left breast with margins Lilibeth Jackman

## 2022-06-17 NOTE — ASU PREOP CHECKLIST - WAS PATIENT ON BETA BLOCKER?
Family notified of surgical progress
Pt. Fall protocol  Yellow armband on patient, yellow non skid socks on  Bed in low position, all side rails up, call bell in reach  Pt. And family instructed in \"call don't fall\" protocol  Use your call bell and wait for assistance, staff not family will assist you to get up and move about  Pt.  And family verbalize understanding of fall precautions and \"call don't fall\" protocol
TRANSFER - OUT REPORT:    Verbal report given to TESS Sutherland(name) on Elisa Erickson  being transferred to [facility](unit) for routine post - op       Report consisted of patients Situation, Background, Assessment and   Recommendations(SBAR). Information from the following report(s) OR Summary, Procedure Summary, MAR and Cardiac Rhythm NSR was reviewed with the receiving nurse. Lines:   Peripheral IV 10/15/19 Left Antecubital (Active)   Site Assessment Clean, dry, & intact 10/16/2019  9:48 AM   Phlebitis Assessment 0 10/16/2019  9:48 AM   Infiltration Assessment 0 10/16/2019  9:48 AM   Dressing Status Clean, dry, & intact 10/16/2019  9:48 AM   Dressing Type Transparent;Tape 10/16/2019  9:48 AM   Hub Color/Line Status Pink; Infusing 10/16/2019  9:48 AM   Action Taken Open ports on tubing capped 10/16/2019  6:12 AM   Alcohol Cap Used Yes 10/16/2019  9:48 AM        Opportunity for questions and clarification was provided.       Patient transported with:   Registered Nurse
Yes

## 2022-06-17 NOTE — ASU DISCHARGE PLAN (ADULT/PEDIATRIC) - ASU DC SPECIAL INSTRUCTIONSFT
Keep dressings dry and intact for 48 hours then remove dressings and may shower over steri strips.   Wear bra day and night for 2 weeks   ice pack to left breast for comfort;  Take pain medication as prescribed by Dr Bell   Follow up with Dr Bell in 2 weeks call office for appointment

## 2022-06-17 NOTE — BRIEF OPERATIVE NOTE - NSICDXBRIEFPOSTOP_GEN_ALL_CORE_FT
POST-OP DIAGNOSIS:  Ductal carcinoma in situ (DCIS) of left breast 17-Jun-2022 15:22:46 s/p Lilibeth Almanza

## 2022-06-17 NOTE — ASU DISCHARGE PLAN (ADULT/PEDIATRIC) - NURSING INSTRUCTIONS
drink plenty fluids, use ice packs 15-20 min/off as needed, take Percolone as needed for pain, do not drive if taking it

## 2022-06-17 NOTE — ASU DISCHARGE PLAN (ADULT/PEDIATRIC) - CARE PROVIDER_API CALL
Calli Bell)  [facility]  [address] [place] [address]  Phone: [phone]  Fax: [phone]  Follow Up Time: 2 weeks

## 2022-06-17 NOTE — BRIEF OPERATIVE NOTE - NSICDXBRIEFPREOP_GEN_ALL_CORE_FT
PRE-OP DIAGNOSIS:  Ductal carcinoma in situ (DCIS) of left breast 17-Jun-2022 15:21:51 S/P Wide excision Lilibeth Jackman

## 2022-06-17 NOTE — ASU DISCHARGE PLAN (ADULT/PEDIATRIC) - NS MD DC FALL RISK RISK
For information on Fall & Injury Prevention, visit: [email or web address] OR  [email or web address] OR  https://www.cdc.gov/steadi/patient.html

## 2022-06-29 LAB — SURGICAL PATHOLOGY STUDY: SIGNIFICANT CHANGE UP

## 2022-08-01 NOTE — ASU DISCHARGE PLAN (ADULT/PEDIATRIC) - A. DRIVE A CAR, OPERATE POWER TOOLS OR MACHINERY
Spoke with dad, pt traveling in [place]. Pt is having testicular pain. Dad is a physician and took pt to ER for US to be sure that it was not torsion and it was not. Wants to speak with Dr. Piper about what this could be or what they should be doing next.    Dad's contact info [phone] Chip Agustín   Please advise, thank you   Statement Selected

## 2022-08-01 NOTE — ASU PATIENT PROFILE, ADULT - NSTOBACCONEVERSMOKERY/N_GEN_A
The patient has been examined and the H&P has been reviewed:    I concur with the findings and no changes have occurred since H&P was written.    Surgery risks, benefits and alternative options discussed and understood by patient/family.          There are no hospital problems to display for this patient.    
No

## 2023-08-07 NOTE — H&P PST ADULT - MEDICATION HERBAL REMEDIES, PROFILE
No care due was identified.  [facility] Embedded Care Due Messages. Reference number: [id number].   8/06/2023 1:33:10 AM CDT  
Refill Decision Note   Randy Yap  is requesting a refill authorization.  Brief Assessment and Rationale for Refill:  Quick Discontinue     Medication Therapy Plan:  Receipt confirmed by pharmacy (8/3/2023  1:26 PM CDT)    Medication Reconciliation Completed: No   Comments:     No Care Gaps recommended.     Note composed:11:42 AM 08/07/2023          
no

## 2023-12-13 ENCOUNTER — INPATIENT (INPATIENT)
Facility: HOSPITAL | Age: 68
LOS: 1 days | Discharge: HOME | End: 2023-12-15
Attending: HOSPITALIST | Admitting: HOSPITALIST
Payer: MEDICARE

## 2023-12-13 VITALS
TEMPERATURE: 99 F | OXYGEN SATURATION: 99 % | RESPIRATION RATE: 16 BRPM | SYSTOLIC BLOOD PRESSURE: 122 MMHG | DIASTOLIC BLOOD PRESSURE: 76 MMHG | HEART RATE: 72 BPM

## 2023-12-13 DIAGNOSIS — Z98.890 OTHER SPECIFIED POSTPROCEDURAL STATES: Chronic | ICD-10-CM

## 2023-12-13 DIAGNOSIS — Z96.652 PRESENCE OF LEFT ARTIFICIAL KNEE JOINT: Chronic | ICD-10-CM

## 2023-12-13 DIAGNOSIS — Z90.49 ACQUIRED ABSENCE OF OTHER SPECIFIED PARTS OF DIGESTIVE TRACT: Chronic | ICD-10-CM

## 2023-12-13 DIAGNOSIS — I26.99 OTHER PULMONARY EMBOLISM WITHOUT ACUTE COR PULMONALE: ICD-10-CM

## 2023-12-13 LAB
ALBUMIN SERPL ELPH-MCNC: 4 G/DL — SIGNIFICANT CHANGE UP (ref 3.3–5)
ALBUMIN SERPL ELPH-MCNC: 4 G/DL — SIGNIFICANT CHANGE UP (ref 3.3–5)
ALP SERPL-CCNC: 97 U/L — SIGNIFICANT CHANGE UP (ref 40–120)
ALP SERPL-CCNC: 97 U/L — SIGNIFICANT CHANGE UP (ref 40–120)
ALT FLD-CCNC: 24 U/L — SIGNIFICANT CHANGE UP (ref 4–33)
ALT FLD-CCNC: 24 U/L — SIGNIFICANT CHANGE UP (ref 4–33)
ANION GAP SERPL CALC-SCNC: 12 MMOL/L — SIGNIFICANT CHANGE UP (ref 7–14)
ANION GAP SERPL CALC-SCNC: 12 MMOL/L — SIGNIFICANT CHANGE UP (ref 7–14)
ANISOCYTOSIS BLD QL: SLIGHT — SIGNIFICANT CHANGE UP
ANISOCYTOSIS BLD QL: SLIGHT — SIGNIFICANT CHANGE UP
AST SERPL-CCNC: 25 U/L — SIGNIFICANT CHANGE UP (ref 4–32)
AST SERPL-CCNC: 25 U/L — SIGNIFICANT CHANGE UP (ref 4–32)
B PERT DNA SPEC QL NAA+PROBE: SIGNIFICANT CHANGE UP
B PERT DNA SPEC QL NAA+PROBE: SIGNIFICANT CHANGE UP
B PERT+PARAPERT DNA PNL SPEC NAA+PROBE: SIGNIFICANT CHANGE UP
B PERT+PARAPERT DNA PNL SPEC NAA+PROBE: SIGNIFICANT CHANGE UP
BASOPHILS # BLD AUTO: 0 K/UL — SIGNIFICANT CHANGE UP (ref 0–0.2)
BASOPHILS # BLD AUTO: 0 K/UL — SIGNIFICANT CHANGE UP (ref 0–0.2)
BASOPHILS NFR BLD AUTO: 0 % — SIGNIFICANT CHANGE UP (ref 0–2)
BASOPHILS NFR BLD AUTO: 0 % — SIGNIFICANT CHANGE UP (ref 0–2)
BILIRUB SERPL-MCNC: 0.3 MG/DL — SIGNIFICANT CHANGE UP (ref 0.2–1.2)
BILIRUB SERPL-MCNC: 0.3 MG/DL — SIGNIFICANT CHANGE UP (ref 0.2–1.2)
BORDETELLA PARAPERTUSSIS (RAPRVP): SIGNIFICANT CHANGE UP
BORDETELLA PARAPERTUSSIS (RAPRVP): SIGNIFICANT CHANGE UP
BUN SERPL-MCNC: 13 MG/DL — SIGNIFICANT CHANGE UP (ref 7–23)
BUN SERPL-MCNC: 13 MG/DL — SIGNIFICANT CHANGE UP (ref 7–23)
C PNEUM DNA SPEC QL NAA+PROBE: SIGNIFICANT CHANGE UP
C PNEUM DNA SPEC QL NAA+PROBE: SIGNIFICANT CHANGE UP
CALCIUM SERPL-MCNC: 9.1 MG/DL — SIGNIFICANT CHANGE UP (ref 8.4–10.5)
CALCIUM SERPL-MCNC: 9.1 MG/DL — SIGNIFICANT CHANGE UP (ref 8.4–10.5)
CHLORIDE SERPL-SCNC: 105 MMOL/L — SIGNIFICANT CHANGE UP (ref 98–107)
CHLORIDE SERPL-SCNC: 105 MMOL/L — SIGNIFICANT CHANGE UP (ref 98–107)
CO2 SERPL-SCNC: 25 MMOL/L — SIGNIFICANT CHANGE UP (ref 22–31)
CO2 SERPL-SCNC: 25 MMOL/L — SIGNIFICANT CHANGE UP (ref 22–31)
CREAT SERPL-MCNC: 0.76 MG/DL — SIGNIFICANT CHANGE UP (ref 0.5–1.3)
CREAT SERPL-MCNC: 0.76 MG/DL — SIGNIFICANT CHANGE UP (ref 0.5–1.3)
D DIMER BLD IA.RAPID-MCNC: 1505 NG/ML DDU — HIGH
D DIMER BLD IA.RAPID-MCNC: 1505 NG/ML DDU — HIGH
DACRYOCYTES BLD QL SMEAR: SLIGHT — SIGNIFICANT CHANGE UP
DACRYOCYTES BLD QL SMEAR: SLIGHT — SIGNIFICANT CHANGE UP
EGFR: 85 ML/MIN/1.73M2 — SIGNIFICANT CHANGE UP
EGFR: 85 ML/MIN/1.73M2 — SIGNIFICANT CHANGE UP
ELLIPTOCYTES BLD QL SMEAR: SIGNIFICANT CHANGE UP
ELLIPTOCYTES BLD QL SMEAR: SIGNIFICANT CHANGE UP
EOSINOPHIL # BLD AUTO: 0 K/UL — SIGNIFICANT CHANGE UP (ref 0–0.5)
EOSINOPHIL # BLD AUTO: 0 K/UL — SIGNIFICANT CHANGE UP (ref 0–0.5)
EOSINOPHIL NFR BLD AUTO: 0 % — SIGNIFICANT CHANGE UP (ref 0–6)
EOSINOPHIL NFR BLD AUTO: 0 % — SIGNIFICANT CHANGE UP (ref 0–6)
FLUAV H1 2009 PAND RNA SPEC QL NAA+PROBE: DETECTED
FLUAV H1 2009 PAND RNA SPEC QL NAA+PROBE: DETECTED
FLUBV RNA SPEC QL NAA+PROBE: SIGNIFICANT CHANGE UP
FLUBV RNA SPEC QL NAA+PROBE: SIGNIFICANT CHANGE UP
GLUCOSE SERPL-MCNC: 95 MG/DL — SIGNIFICANT CHANGE UP (ref 70–99)
GLUCOSE SERPL-MCNC: 95 MG/DL — SIGNIFICANT CHANGE UP (ref 70–99)
HADV DNA SPEC QL NAA+PROBE: SIGNIFICANT CHANGE UP
HADV DNA SPEC QL NAA+PROBE: SIGNIFICANT CHANGE UP
HCOV 229E RNA SPEC QL NAA+PROBE: SIGNIFICANT CHANGE UP
HCOV 229E RNA SPEC QL NAA+PROBE: SIGNIFICANT CHANGE UP
HCOV HKU1 RNA SPEC QL NAA+PROBE: SIGNIFICANT CHANGE UP
HCOV HKU1 RNA SPEC QL NAA+PROBE: SIGNIFICANT CHANGE UP
HCOV NL63 RNA SPEC QL NAA+PROBE: SIGNIFICANT CHANGE UP
HCOV NL63 RNA SPEC QL NAA+PROBE: SIGNIFICANT CHANGE UP
HCOV OC43 RNA SPEC QL NAA+PROBE: SIGNIFICANT CHANGE UP
HCOV OC43 RNA SPEC QL NAA+PROBE: SIGNIFICANT CHANGE UP
HCT VFR BLD CALC: 35 % — SIGNIFICANT CHANGE UP (ref 34.5–45)
HCT VFR BLD CALC: 35 % — SIGNIFICANT CHANGE UP (ref 34.5–45)
HGB BLD-MCNC: 10.9 G/DL — LOW (ref 11.5–15.5)
HGB BLD-MCNC: 10.9 G/DL — LOW (ref 11.5–15.5)
HMPV RNA SPEC QL NAA+PROBE: SIGNIFICANT CHANGE UP
HMPV RNA SPEC QL NAA+PROBE: SIGNIFICANT CHANGE UP
HPIV1 RNA SPEC QL NAA+PROBE: SIGNIFICANT CHANGE UP
HPIV1 RNA SPEC QL NAA+PROBE: SIGNIFICANT CHANGE UP
HPIV2 RNA SPEC QL NAA+PROBE: SIGNIFICANT CHANGE UP
HPIV2 RNA SPEC QL NAA+PROBE: SIGNIFICANT CHANGE UP
HPIV3 RNA SPEC QL NAA+PROBE: SIGNIFICANT CHANGE UP
HPIV3 RNA SPEC QL NAA+PROBE: SIGNIFICANT CHANGE UP
HPIV4 RNA SPEC QL NAA+PROBE: SIGNIFICANT CHANGE UP
HPIV4 RNA SPEC QL NAA+PROBE: SIGNIFICANT CHANGE UP
HYPOCHROMIA BLD QL: SLIGHT — SIGNIFICANT CHANGE UP
HYPOCHROMIA BLD QL: SLIGHT — SIGNIFICANT CHANGE UP
IANC: 1.42 K/UL — LOW (ref 1.8–7.4)
IANC: 1.42 K/UL — LOW (ref 1.8–7.4)
LYMPHOCYTES # BLD AUTO: 1.08 K/UL — SIGNIFICANT CHANGE UP (ref 1–3.3)
LYMPHOCYTES # BLD AUTO: 1.08 K/UL — SIGNIFICANT CHANGE UP (ref 1–3.3)
LYMPHOCYTES # BLD AUTO: 33.3 % — SIGNIFICANT CHANGE UP (ref 13–44)
LYMPHOCYTES # BLD AUTO: 33.3 % — SIGNIFICANT CHANGE UP (ref 13–44)
M PNEUMO DNA SPEC QL NAA+PROBE: SIGNIFICANT CHANGE UP
M PNEUMO DNA SPEC QL NAA+PROBE: SIGNIFICANT CHANGE UP
MACROCYTES BLD QL: SLIGHT — SIGNIFICANT CHANGE UP
MACROCYTES BLD QL: SLIGHT — SIGNIFICANT CHANGE UP
MCHC RBC-ENTMCNC: 27.6 PG — SIGNIFICANT CHANGE UP (ref 27–34)
MCHC RBC-ENTMCNC: 27.6 PG — SIGNIFICANT CHANGE UP (ref 27–34)
MCHC RBC-ENTMCNC: 31.1 GM/DL — LOW (ref 32–36)
MCHC RBC-ENTMCNC: 31.1 GM/DL — LOW (ref 32–36)
MCV RBC AUTO: 88.6 FL — SIGNIFICANT CHANGE UP (ref 80–100)
MCV RBC AUTO: 88.6 FL — SIGNIFICANT CHANGE UP (ref 80–100)
MONOCYTES # BLD AUTO: 0.33 K/UL — SIGNIFICANT CHANGE UP (ref 0–0.9)
MONOCYTES # BLD AUTO: 0.33 K/UL — SIGNIFICANT CHANGE UP (ref 0–0.9)
MONOCYTES NFR BLD AUTO: 10.2 % — SIGNIFICANT CHANGE UP (ref 2–14)
MONOCYTES NFR BLD AUTO: 10.2 % — SIGNIFICANT CHANGE UP (ref 2–14)
NEUTROPHILS # BLD AUTO: 1.74 K/UL — LOW (ref 1.8–7.4)
NEUTROPHILS # BLD AUTO: 1.74 K/UL — LOW (ref 1.8–7.4)
NEUTROPHILS NFR BLD AUTO: 45.4 % — SIGNIFICANT CHANGE UP (ref 43–77)
NEUTROPHILS NFR BLD AUTO: 45.4 % — SIGNIFICANT CHANGE UP (ref 43–77)
NEUTS BAND # BLD: 8.3 % — HIGH (ref 0–6)
NEUTS BAND # BLD: 8.3 % — HIGH (ref 0–6)
OVALOCYTES BLD QL SMEAR: SLIGHT — SIGNIFICANT CHANGE UP
OVALOCYTES BLD QL SMEAR: SLIGHT — SIGNIFICANT CHANGE UP
PLAT MORPH BLD: NORMAL — SIGNIFICANT CHANGE UP
PLAT MORPH BLD: NORMAL — SIGNIFICANT CHANGE UP
PLATELET # BLD AUTO: 293 K/UL — SIGNIFICANT CHANGE UP (ref 150–400)
PLATELET # BLD AUTO: 293 K/UL — SIGNIFICANT CHANGE UP (ref 150–400)
PLATELET COUNT - ESTIMATE: NORMAL — SIGNIFICANT CHANGE UP
PLATELET COUNT - ESTIMATE: NORMAL — SIGNIFICANT CHANGE UP
POIKILOCYTOSIS BLD QL AUTO: SIGNIFICANT CHANGE UP
POIKILOCYTOSIS BLD QL AUTO: SIGNIFICANT CHANGE UP
POLYCHROMASIA BLD QL SMEAR: SLIGHT — SIGNIFICANT CHANGE UP
POLYCHROMASIA BLD QL SMEAR: SLIGHT — SIGNIFICANT CHANGE UP
POTASSIUM SERPL-MCNC: 4.5 MMOL/L — SIGNIFICANT CHANGE UP (ref 3.5–5.3)
POTASSIUM SERPL-MCNC: 4.5 MMOL/L — SIGNIFICANT CHANGE UP (ref 3.5–5.3)
POTASSIUM SERPL-SCNC: 4.5 MMOL/L — SIGNIFICANT CHANGE UP (ref 3.5–5.3)
POTASSIUM SERPL-SCNC: 4.5 MMOL/L — SIGNIFICANT CHANGE UP (ref 3.5–5.3)
PROT SERPL-MCNC: 7.3 G/DL — SIGNIFICANT CHANGE UP (ref 6–8.3)
PROT SERPL-MCNC: 7.3 G/DL — SIGNIFICANT CHANGE UP (ref 6–8.3)
RAPID RVP RESULT: DETECTED
RAPID RVP RESULT: DETECTED
RBC # BLD: 3.95 M/UL — SIGNIFICANT CHANGE UP (ref 3.8–5.2)
RBC # BLD: 3.95 M/UL — SIGNIFICANT CHANGE UP (ref 3.8–5.2)
RBC # FLD: 14.7 % — HIGH (ref 10.3–14.5)
RBC # FLD: 14.7 % — HIGH (ref 10.3–14.5)
RBC BLD AUTO: ABNORMAL
RBC BLD AUTO: ABNORMAL
RSV RNA SPEC QL NAA+PROBE: SIGNIFICANT CHANGE UP
RSV RNA SPEC QL NAA+PROBE: SIGNIFICANT CHANGE UP
RV+EV RNA SPEC QL NAA+PROBE: SIGNIFICANT CHANGE UP
RV+EV RNA SPEC QL NAA+PROBE: SIGNIFICANT CHANGE UP
SARS-COV-2 RNA SPEC QL NAA+PROBE: DETECTED
SARS-COV-2 RNA SPEC QL NAA+PROBE: DETECTED
SMUDGE CELLS # BLD: PRESENT — SIGNIFICANT CHANGE UP
SMUDGE CELLS # BLD: PRESENT — SIGNIFICANT CHANGE UP
SODIUM SERPL-SCNC: 142 MMOL/L — SIGNIFICANT CHANGE UP (ref 135–145)
SODIUM SERPL-SCNC: 142 MMOL/L — SIGNIFICANT CHANGE UP (ref 135–145)
TROPONIN T, HIGH SENSITIVITY RESULT: 7 NG/L — SIGNIFICANT CHANGE UP
TROPONIN T, HIGH SENSITIVITY RESULT: 7 NG/L — SIGNIFICANT CHANGE UP
VARIANT LYMPHS # BLD: 2.8 % — SIGNIFICANT CHANGE UP (ref 0–6)
VARIANT LYMPHS # BLD: 2.8 % — SIGNIFICANT CHANGE UP (ref 0–6)
WBC # BLD: 3.24 K/UL — LOW (ref 3.8–10.5)
WBC # BLD: 3.24 K/UL — LOW (ref 3.8–10.5)
WBC # FLD AUTO: 3.24 K/UL — LOW (ref 3.8–10.5)
WBC # FLD AUTO: 3.24 K/UL — LOW (ref 3.8–10.5)

## 2023-12-13 PROCEDURE — 71275 CT ANGIOGRAPHY CHEST: CPT | Mod: 26,MA

## 2023-12-13 PROCEDURE — 73610 X-RAY EXAM OF ANKLE: CPT | Mod: 26,LT

## 2023-12-13 PROCEDURE — 99285 EMERGENCY DEPT VISIT HI MDM: CPT

## 2023-12-13 PROCEDURE — 93970 EXTREMITY STUDY: CPT | Mod: 26

## 2023-12-13 PROCEDURE — 71046 X-RAY EXAM CHEST 2 VIEWS: CPT | Mod: 26

## 2023-12-13 PROCEDURE — 73590 X-RAY EXAM OF LOWER LEG: CPT | Mod: 26,LT

## 2023-12-13 RX ORDER — HEPARIN SODIUM 5000 [USP'U]/ML
2000 INJECTION INTRAVENOUS; SUBCUTANEOUS EVERY 6 HOURS
Refills: 0 | Status: DISCONTINUED | OUTPATIENT
Start: 2023-12-13 | End: 2023-12-15

## 2023-12-13 RX ORDER — HEPARIN SODIUM 5000 [USP'U]/ML
INJECTION INTRAVENOUS; SUBCUTANEOUS
Qty: 25000 | Refills: 0 | Status: DISCONTINUED | OUTPATIENT
Start: 2023-12-13 | End: 2023-12-15

## 2023-12-13 RX ORDER — HEPARIN SODIUM 5000 [USP'U]/ML
4000 INJECTION INTRAVENOUS; SUBCUTANEOUS ONCE
Refills: 0 | Status: COMPLETED | OUTPATIENT
Start: 2023-12-13 | End: 2023-12-14

## 2023-12-13 RX ORDER — SODIUM CHLORIDE 9 MG/ML
1000 INJECTION INTRAMUSCULAR; INTRAVENOUS; SUBCUTANEOUS ONCE
Refills: 0 | Status: COMPLETED | OUTPATIENT
Start: 2023-12-13 | End: 2023-12-13

## 2023-12-13 RX ORDER — HEPARIN SODIUM 5000 [USP'U]/ML
4000 INJECTION INTRAVENOUS; SUBCUTANEOUS EVERY 6 HOURS
Refills: 0 | Status: DISCONTINUED | OUTPATIENT
Start: 2023-12-13 | End: 2023-12-15

## 2023-12-13 RX ADMIN — SODIUM CHLORIDE 1000 MILLILITER(S): 9 INJECTION INTRAMUSCULAR; INTRAVENOUS; SUBCUTANEOUS at 18:16

## 2023-12-13 NOTE — ED PROVIDER NOTE - PROGRESS NOTE DETAILS
MERLINE Lynn: Pt received at sign out pending CTA Chest, (+) for b/l PEs, will start heparin - pt made aware, will admit to hospitalist. Hospitalist accepted case.

## 2023-12-13 NOTE — ED PROVIDER NOTE - OBJECTIVE STATEMENT
68-year-old female history of hypertension, hyperlipidemia, RA, history of left breast DCIS status postlumpectomy/radiation, presenting to ED with chest tightness.  Patient tested positive for COVID on 12/2 while traveling from [place] to [facility] at that time. symptoms started 11/29.  Patient states that she came home and saw her primary care doctor was given Paxlovid which relieved her symptoms however 3 days ago she redeveloped fevers max temp 100.4 at home, persistent cough. Saw her PCP and was started on doxycycline (taken 4 doses so far). And over the past few days admits to midsternal chest tightness.  Denies any associated shortness of breath, nausea, diaphoresis, palpitations.  No history of DVT/PE. Of note, pt also admits to rolling her L ankle, in which she had xrays done but is "unsure" of the restuls nad would like them to be repeated. admits to significant pain with ambulation. Denies numbness, tingling, dizziness, chest pain, shortness of breath, abd pain. has been taking zofran for nausea. 68-year-old female history of hypertension, hyperlipidemia, RA, history of left breast DCIS status postlumpectomy/radiation, presenting to ED with chest tightness.  Patient tested positive for COVID on 12/2 while traveling from [place] to [facility] at that time. symptoms started 11/29.  Patient states that she came home and saw her primary care doctor was given Paxlovid which relieved her symptoms however 3 days ago she redeveloped fevers max temp 100.4 at home, persistent cough. Saw her PCP and was started on doxycycline (taken 4 doses so far). And over the past few days admits to midsternal chest tightness.  Denies any associated shortness of breath, nausea, diaphoresis, palpitations.  No history of DVT/PE. Of note, pt also admits to rolling her L ankle, in which she had xrays done but is "unsure" of the restuls nad would like them to be repeated. admits to significant pain with ambulation. Denies numbness, tingling, dizziness, chest pain, shortness of breath, abd pain. has been taking zofran for nausea.

## 2023-12-13 NOTE — ED ADULT NURSE NOTE - CHIEF COMPLAINT QUOTE
Pt  covid Positive Dec 2nd after return from [facility], c/o N/V, fever, chills, headache., chest tightness and cough.  Pt twisted L foot . c/o L ankle pain Pt  covid Positive Dec 2nd after return from [facility], c/o N/V, fever, chills, headache., chest tightness and cough.  Pt twisted L foot . c/o L ankle pain

## 2023-12-13 NOTE — ED PROVIDER NOTE - CLINICAL SUMMARY MEDICAL DECISION MAKING FREE TEXT BOX
68yoF PMH HTN, HLD, breast ca post lumpectomy/RT, covid + 12/2, post paxlovid, persistent symptoms, now on doxycycline p/w fevers, cough, shortness of breath and chest tightness  will r/o PE via ddimer (not tachy or hypoxic) therefore low suspicion, given recent long travel, and inactivity 2/2 ankle pain  ECG NSR nonischemic  will check labs, trop, CXR  will check repeat ankle films given persistent pain  if above w/u negative, will dc

## 2023-12-13 NOTE — ED PROVIDER NOTE - PHYSICAL EXAMINATION
CONSTITUTIONAL: Well-appearing; well-nourished; in no apparent distress;  HEAD: Normocephalic, atraumatic;  CARD: Normal S1, S2; no murmurs, rubs, or gallops noted  RESP: Normal chest excursion with respiration; breath sounds clear and equal bilaterally; no wheezes, rhonchi, or rales noted  ABD/GI: soft, non-distended; non-tender; no palpable organomegaly, no pulsatile mass  EXT/MS: moves all extremities  SKIN: Normal for age and race; warm; dry; good turgor; no apparent lesions or exudate noted  NEURO: Awake, alert, oriented x 3, no gross deficits  PSYCH: Normal mood; appropriate affect

## 2023-12-13 NOTE — ED ADULT NURSE REASSESSMENT NOTE - NS ED NURSE REASSESS COMMENT FT1
patient laying in semi fowlers position on the stretcher. patient alert and oriented times four. patient denies shortness of breath, nausea, vomiting, chill, fever. Vitals unremarkable.  Patient endorses chest pressure 2/10 that stays in the middle of her chest that has been going on for over a week as per chief complaint and only on inspiration P.A Lanik aware.  Respirations equal and adequate. Lung sounds clear. Patients IV patent, no signs of infiltration. A second IV was placed prior to the insertion of heparin. Patient has a 20 gauge in right and left a.c.  Patient started on heparin, as per emr.  Safety measures in place, call bell within reach. Patient stable upon leaving the room.

## 2023-12-13 NOTE — ED ADULT NURSE NOTE - NSFALLUNIVINTERV_ED_ALL_ED
Bed/Stretcher in lowest position, wheels locked, appropriate side rails in place/Call bell, personal items and telephone in reach/Instruct patient to call for assistance before getting out of bed/chair/stretcher/Non-slip footwear applied when patient is off stretcher/[place] to call system/Physically safe environment - no spills, clutter or unnecessary equipment/Purposeful proactive rounding/Room/bathroom lighting operational, light cord in reach Bed/Stretcher in lowest position, wheels locked, appropriate side rails in place/Call bell, personal items and telephone in reach/Instruct patient to call for assistance before getting out of bed/chair/stretcher/Non-slip footwear applied when patient is off stretcher/[place] to call system/Physically safe environment - no spills, clutter or unnecessary equipment/Purposeful proactive rounding/Room/bathroom lighting operational, light cord in reach

## 2023-12-13 NOTE — ED ADULT TRIAGE NOTE - CHIEF COMPLAINT QUOTE
Pt  covid Positive Dec 2nd after return from [facility], c/o N/V, fever, chills, headache., chest tightness and cough.  Pt twisted L foot . c/o L ankle pain Pt  covid Positive Dec 2nd after return from [facility], c/o N/V, fever, chills, headache., chest tightness and cough.  Pt twisted L foot . c/o L ankle pain

## 2023-12-13 NOTE — ED ADULT NURSE NOTE - OBJECTIVE STATEMENT
pt aox4, ambulatory- comes in for covid symptoms for almost 2 weeks. no sob, respirations even and unlabored. pt endorsing aches and low grade fevers. pt well appearing, speaking full sentences.     right ac20g inserted using aseptic technique, +blood return and flushed well. stretcher in lowest position. pt medicated as per MD orders. hand off given to Primary RN Tisha.

## 2023-12-14 ENCOUNTER — TRANSCRIPTION ENCOUNTER (OUTPATIENT)
Age: 68
End: 2023-12-14

## 2023-12-14 DIAGNOSIS — M25.572 PAIN IN LEFT ANKLE AND JOINTS OF LEFT FOOT: ICD-10-CM

## 2023-12-14 DIAGNOSIS — J10.1 INFLUENZA DUE TO OTHER IDENTIFIED INFLUENZA VIRUS WITH OTHER RESPIRATORY MANIFESTATIONS: ICD-10-CM

## 2023-12-14 DIAGNOSIS — E78.5 HYPERLIPIDEMIA, UNSPECIFIED: ICD-10-CM

## 2023-12-14 DIAGNOSIS — M06.9 RHEUMATOID ARTHRITIS, UNSPECIFIED: ICD-10-CM

## 2023-12-14 DIAGNOSIS — I26.99 OTHER PULMONARY EMBOLISM WITHOUT ACUTE COR PULMONALE: ICD-10-CM

## 2023-12-14 DIAGNOSIS — Z29.9 ENCOUNTER FOR PROPHYLACTIC MEASURES, UNSPECIFIED: ICD-10-CM

## 2023-12-14 DIAGNOSIS — I10 ESSENTIAL (PRIMARY) HYPERTENSION: ICD-10-CM

## 2023-12-14 DIAGNOSIS — U07.1 COVID-19: ICD-10-CM

## 2023-12-14 DIAGNOSIS — R91.1 SOLITARY PULMONARY NODULE: ICD-10-CM

## 2023-12-14 DIAGNOSIS — D64.9 ANEMIA, UNSPECIFIED: ICD-10-CM

## 2023-12-14 DIAGNOSIS — D75.89 OTHER SPECIFIED DISEASES OF BLOOD AND BLOOD-FORMING ORGANS: ICD-10-CM

## 2023-12-14 DIAGNOSIS — D70.9 NEUTROPENIA, UNSPECIFIED: ICD-10-CM

## 2023-12-14 DIAGNOSIS — D05.12 INTRADUCTAL CARCINOMA IN SITU OF LEFT BREAST: ICD-10-CM

## 2023-12-14 DIAGNOSIS — K21.9 GASTRO-ESOPHAGEAL REFLUX DISEASE WITHOUT ESOPHAGITIS: ICD-10-CM

## 2023-12-14 LAB
ANION GAP SERPL CALC-SCNC: 12 MMOL/L — SIGNIFICANT CHANGE UP (ref 7–14)
ANION GAP SERPL CALC-SCNC: 12 MMOL/L — SIGNIFICANT CHANGE UP (ref 7–14)
APPEARANCE UR: CLEAR — SIGNIFICANT CHANGE UP
APPEARANCE UR: CLEAR — SIGNIFICANT CHANGE UP
APTT BLD: 104.4 SEC — SIGNIFICANT CHANGE UP (ref 24.5–35.6)
APTT BLD: 104.4 SEC — SIGNIFICANT CHANGE UP (ref 24.5–35.6)
APTT BLD: 153.3 SEC — CRITICAL HIGH (ref 24.5–35.6)
APTT BLD: 153.3 SEC — CRITICAL HIGH (ref 24.5–35.6)
APTT BLD: 27.8 SEC — SIGNIFICANT CHANGE UP (ref 24.5–35.6)
APTT BLD: 27.8 SEC — SIGNIFICANT CHANGE UP (ref 24.5–35.6)
APTT BLD: 80.6 SEC — HIGH (ref 24.5–35.6)
APTT BLD: 80.6 SEC — HIGH (ref 24.5–35.6)
BASOPHILS # BLD AUTO: 0.01 K/UL — SIGNIFICANT CHANGE UP (ref 0–0.2)
BASOPHILS # BLD AUTO: 0.01 K/UL — SIGNIFICANT CHANGE UP (ref 0–0.2)
BASOPHILS NFR BLD AUTO: 0.3 % — SIGNIFICANT CHANGE UP (ref 0–2)
BASOPHILS NFR BLD AUTO: 0.3 % — SIGNIFICANT CHANGE UP (ref 0–2)
BILIRUB UR-MCNC: NEGATIVE — SIGNIFICANT CHANGE UP
BILIRUB UR-MCNC: NEGATIVE — SIGNIFICANT CHANGE UP
BUN SERPL-MCNC: 11 MG/DL — SIGNIFICANT CHANGE UP (ref 7–23)
BUN SERPL-MCNC: 11 MG/DL — SIGNIFICANT CHANGE UP (ref 7–23)
CALCIUM SERPL-MCNC: 8.5 MG/DL — SIGNIFICANT CHANGE UP (ref 8.4–10.5)
CALCIUM SERPL-MCNC: 8.5 MG/DL — SIGNIFICANT CHANGE UP (ref 8.4–10.5)
CHLORIDE SERPL-SCNC: 104 MMOL/L — SIGNIFICANT CHANGE UP (ref 98–107)
CHLORIDE SERPL-SCNC: 104 MMOL/L — SIGNIFICANT CHANGE UP (ref 98–107)
CO2 SERPL-SCNC: 24 MMOL/L — SIGNIFICANT CHANGE UP (ref 22–31)
CO2 SERPL-SCNC: 24 MMOL/L — SIGNIFICANT CHANGE UP (ref 22–31)
COLOR SPEC: YELLOW — SIGNIFICANT CHANGE UP
COLOR SPEC: YELLOW — SIGNIFICANT CHANGE UP
CREAT SERPL-MCNC: 0.77 MG/DL — SIGNIFICANT CHANGE UP (ref 0.5–1.3)
CREAT SERPL-MCNC: 0.77 MG/DL — SIGNIFICANT CHANGE UP (ref 0.5–1.3)
CRP SERPL-MCNC: <3 MG/L — SIGNIFICANT CHANGE UP
CRP SERPL-MCNC: <3 MG/L — SIGNIFICANT CHANGE UP
DIFF PNL FLD: NEGATIVE — SIGNIFICANT CHANGE UP
DIFF PNL FLD: NEGATIVE — SIGNIFICANT CHANGE UP
EGFR: 84 ML/MIN/1.73M2 — SIGNIFICANT CHANGE UP
EGFR: 84 ML/MIN/1.73M2 — SIGNIFICANT CHANGE UP
EOSINOPHIL # BLD AUTO: 0.03 K/UL — SIGNIFICANT CHANGE UP (ref 0–0.5)
EOSINOPHIL # BLD AUTO: 0.03 K/UL — SIGNIFICANT CHANGE UP (ref 0–0.5)
EOSINOPHIL NFR BLD AUTO: 0.9 % — SIGNIFICANT CHANGE UP (ref 0–6)
EOSINOPHIL NFR BLD AUTO: 0.9 % — SIGNIFICANT CHANGE UP (ref 0–6)
FERRITIN SERPL-MCNC: 246 NG/ML — SIGNIFICANT CHANGE UP (ref 13–330)
FERRITIN SERPL-MCNC: 246 NG/ML — SIGNIFICANT CHANGE UP (ref 13–330)
GLUCOSE SERPL-MCNC: 90 MG/DL — SIGNIFICANT CHANGE UP (ref 70–99)
GLUCOSE SERPL-MCNC: 90 MG/DL — SIGNIFICANT CHANGE UP (ref 70–99)
GLUCOSE UR QL: NEGATIVE MG/DL — SIGNIFICANT CHANGE UP
GLUCOSE UR QL: NEGATIVE MG/DL — SIGNIFICANT CHANGE UP
HCT VFR BLD CALC: 32 % — LOW (ref 34.5–45)
HCT VFR BLD CALC: 32 % — LOW (ref 34.5–45)
HCT VFR BLD CALC: 32.9 % — LOW (ref 34.5–45)
HCT VFR BLD CALC: 32.9 % — LOW (ref 34.5–45)
HGB BLD-MCNC: 10.3 G/DL — LOW (ref 11.5–15.5)
IANC: 1.56 K/UL — LOW (ref 1.8–7.4)
IANC: 1.56 K/UL — LOW (ref 1.8–7.4)
IMM GRANULOCYTES NFR BLD AUTO: 0.3 % — SIGNIFICANT CHANGE UP (ref 0–0.9)
IMM GRANULOCYTES NFR BLD AUTO: 0.3 % — SIGNIFICANT CHANGE UP (ref 0–0.9)
INR BLD: 1.06 RATIO — SIGNIFICANT CHANGE UP (ref 0.85–1.18)
INR BLD: 1.06 RATIO — SIGNIFICANT CHANGE UP (ref 0.85–1.18)
IRON SATN MFR SERPL: 24 % — SIGNIFICANT CHANGE UP (ref 14–50)
IRON SATN MFR SERPL: 24 % — SIGNIFICANT CHANGE UP (ref 14–50)
IRON SATN MFR SERPL: 78 UG/DL — SIGNIFICANT CHANGE UP (ref 30–160)
IRON SATN MFR SERPL: 78 UG/DL — SIGNIFICANT CHANGE UP (ref 30–160)
KETONES UR-MCNC: NEGATIVE MG/DL — SIGNIFICANT CHANGE UP
KETONES UR-MCNC: NEGATIVE MG/DL — SIGNIFICANT CHANGE UP
LEUKOCYTE ESTERASE UR-ACNC: NEGATIVE — SIGNIFICANT CHANGE UP
LEUKOCYTE ESTERASE UR-ACNC: NEGATIVE — SIGNIFICANT CHANGE UP
LYMPHOCYTES # BLD AUTO: 1.15 K/UL — SIGNIFICANT CHANGE UP (ref 1–3.3)
LYMPHOCYTES # BLD AUTO: 1.15 K/UL — SIGNIFICANT CHANGE UP (ref 1–3.3)
LYMPHOCYTES # BLD AUTO: 35.3 % — SIGNIFICANT CHANGE UP (ref 13–44)
LYMPHOCYTES # BLD AUTO: 35.3 % — SIGNIFICANT CHANGE UP (ref 13–44)
MAGNESIUM SERPL-MCNC: 2 MG/DL — SIGNIFICANT CHANGE UP (ref 1.6–2.6)
MAGNESIUM SERPL-MCNC: 2 MG/DL — SIGNIFICANT CHANGE UP (ref 1.6–2.6)
MCHC RBC-ENTMCNC: 27.5 PG — SIGNIFICANT CHANGE UP (ref 27–34)
MCHC RBC-ENTMCNC: 27.5 PG — SIGNIFICANT CHANGE UP (ref 27–34)
MCHC RBC-ENTMCNC: 27.8 PG — SIGNIFICANT CHANGE UP (ref 27–34)
MCHC RBC-ENTMCNC: 27.8 PG — SIGNIFICANT CHANGE UP (ref 27–34)
MCHC RBC-ENTMCNC: 31.3 GM/DL — LOW (ref 32–36)
MCHC RBC-ENTMCNC: 31.3 GM/DL — LOW (ref 32–36)
MCHC RBC-ENTMCNC: 32.2 GM/DL — SIGNIFICANT CHANGE UP (ref 32–36)
MCHC RBC-ENTMCNC: 32.2 GM/DL — SIGNIFICANT CHANGE UP (ref 32–36)
MCV RBC AUTO: 86.3 FL — SIGNIFICANT CHANGE UP (ref 80–100)
MCV RBC AUTO: 86.3 FL — SIGNIFICANT CHANGE UP (ref 80–100)
MCV RBC AUTO: 88 FL — SIGNIFICANT CHANGE UP (ref 80–100)
MCV RBC AUTO: 88 FL — SIGNIFICANT CHANGE UP (ref 80–100)
MONOCYTES # BLD AUTO: 0.5 K/UL — SIGNIFICANT CHANGE UP (ref 0–0.9)
MONOCYTES # BLD AUTO: 0.5 K/UL — SIGNIFICANT CHANGE UP (ref 0–0.9)
MONOCYTES NFR BLD AUTO: 15.3 % — HIGH (ref 2–14)
MONOCYTES NFR BLD AUTO: 15.3 % — HIGH (ref 2–14)
NEUTROPHILS # BLD AUTO: 1.56 K/UL — LOW (ref 1.8–7.4)
NEUTROPHILS # BLD AUTO: 1.56 K/UL — LOW (ref 1.8–7.4)
NEUTROPHILS NFR BLD AUTO: 47.9 % — SIGNIFICANT CHANGE UP (ref 43–77)
NEUTROPHILS NFR BLD AUTO: 47.9 % — SIGNIFICANT CHANGE UP (ref 43–77)
NITRITE UR-MCNC: NEGATIVE — SIGNIFICANT CHANGE UP
NITRITE UR-MCNC: NEGATIVE — SIGNIFICANT CHANGE UP
NRBC # BLD: 0 /100 WBCS — SIGNIFICANT CHANGE UP (ref 0–0)
NRBC # FLD: 0 K/UL — SIGNIFICANT CHANGE UP (ref 0–0)
NT-PROBNP SERPL-SCNC: 83 PG/ML — SIGNIFICANT CHANGE UP
NT-PROBNP SERPL-SCNC: 83 PG/ML — SIGNIFICANT CHANGE UP
PH UR: 7.5 — SIGNIFICANT CHANGE UP (ref 5–8)
PH UR: 7.5 — SIGNIFICANT CHANGE UP (ref 5–8)
PHOSPHATE SERPL-MCNC: 4.1 MG/DL — SIGNIFICANT CHANGE UP (ref 2.5–4.5)
PHOSPHATE SERPL-MCNC: 4.1 MG/DL — SIGNIFICANT CHANGE UP (ref 2.5–4.5)
PLATELET # BLD AUTO: 276 K/UL — SIGNIFICANT CHANGE UP (ref 150–400)
PLATELET # BLD AUTO: 276 K/UL — SIGNIFICANT CHANGE UP (ref 150–400)
PLATELET # BLD AUTO: 290 K/UL — SIGNIFICANT CHANGE UP (ref 150–400)
PLATELET # BLD AUTO: 290 K/UL — SIGNIFICANT CHANGE UP (ref 150–400)
POTASSIUM SERPL-MCNC: 4.2 MMOL/L — SIGNIFICANT CHANGE UP (ref 3.5–5.3)
POTASSIUM SERPL-MCNC: 4.2 MMOL/L — SIGNIFICANT CHANGE UP (ref 3.5–5.3)
POTASSIUM SERPL-SCNC: 4.2 MMOL/L — SIGNIFICANT CHANGE UP (ref 3.5–5.3)
POTASSIUM SERPL-SCNC: 4.2 MMOL/L — SIGNIFICANT CHANGE UP (ref 3.5–5.3)
PROCALCITONIN SERPL-MCNC: 0.03 NG/ML — SIGNIFICANT CHANGE UP (ref 0.02–0.1)
PROCALCITONIN SERPL-MCNC: 0.03 NG/ML — SIGNIFICANT CHANGE UP (ref 0.02–0.1)
PROT UR-MCNC: NEGATIVE MG/DL — SIGNIFICANT CHANGE UP
PROT UR-MCNC: NEGATIVE MG/DL — SIGNIFICANT CHANGE UP
PROTHROM AB SERPL-ACNC: 12 SEC — SIGNIFICANT CHANGE UP (ref 9.5–13)
PROTHROM AB SERPL-ACNC: 12 SEC — SIGNIFICANT CHANGE UP (ref 9.5–13)
RBC # BLD: 3.71 M/UL — LOW (ref 3.8–5.2)
RBC # BLD: 3.71 M/UL — LOW (ref 3.8–5.2)
RBC # BLD: 3.74 M/UL — LOW (ref 3.8–5.2)
RBC # BLD: 3.74 M/UL — LOW (ref 3.8–5.2)
RBC # FLD: 14.6 % — HIGH (ref 10.3–14.5)
RBC # FLD: 14.6 % — HIGH (ref 10.3–14.5)
RBC # FLD: 14.7 % — HIGH (ref 10.3–14.5)
RBC # FLD: 14.7 % — HIGH (ref 10.3–14.5)
SODIUM SERPL-SCNC: 140 MMOL/L — SIGNIFICANT CHANGE UP (ref 135–145)
SODIUM SERPL-SCNC: 140 MMOL/L — SIGNIFICANT CHANGE UP (ref 135–145)
SP GR SPEC: 1.03 — SIGNIFICANT CHANGE UP (ref 1–1.03)
SP GR SPEC: 1.03 — SIGNIFICANT CHANGE UP (ref 1–1.03)
TIBC SERPL-MCNC: 327 UG/DL — SIGNIFICANT CHANGE UP (ref 220–430)
TIBC SERPL-MCNC: 327 UG/DL — SIGNIFICANT CHANGE UP (ref 220–430)
TROPONIN T, HIGH SENSITIVITY RESULT: 8 NG/L — SIGNIFICANT CHANGE UP
TROPONIN T, HIGH SENSITIVITY RESULT: 8 NG/L — SIGNIFICANT CHANGE UP
UIBC SERPL-MCNC: 249 UG/DL — SIGNIFICANT CHANGE UP (ref 110–370)
UIBC SERPL-MCNC: 249 UG/DL — SIGNIFICANT CHANGE UP (ref 110–370)
UROBILINOGEN FLD QL: 0.2 MG/DL — SIGNIFICANT CHANGE UP (ref 0.2–1)
UROBILINOGEN FLD QL: 0.2 MG/DL — SIGNIFICANT CHANGE UP (ref 0.2–1)
WBC # BLD: 3.17 K/UL — LOW (ref 3.8–10.5)
WBC # BLD: 3.17 K/UL — LOW (ref 3.8–10.5)
WBC # BLD: 3.26 K/UL — LOW (ref 3.8–10.5)
WBC # BLD: 3.26 K/UL — LOW (ref 3.8–10.5)
WBC # FLD AUTO: 3.17 K/UL — LOW (ref 3.8–10.5)
WBC # FLD AUTO: 3.17 K/UL — LOW (ref 3.8–10.5)
WBC # FLD AUTO: 3.26 K/UL — LOW (ref 3.8–10.5)
WBC # FLD AUTO: 3.26 K/UL — LOW (ref 3.8–10.5)

## 2023-12-14 PROCEDURE — 99223 1ST HOSP IP/OBS HIGH 75: CPT

## 2023-12-14 RX ORDER — ACETAMINOPHEN 500 MG
1000 TABLET ORAL ONCE
Refills: 0 | Status: DISCONTINUED | OUTPATIENT
Start: 2023-12-14 | End: 2023-12-15

## 2023-12-14 RX ORDER — APIXABAN 2.5 MG/1
1 TABLET, FILM COATED ORAL
Qty: 60 | Refills: 0
Start: 2023-12-14 | End: 2024-01-12

## 2023-12-14 RX ORDER — AMLODIPINE BESYLATE AND BENAZEPRIL HYDROCHLORIDE 10; 20 MG/1; MG/1
1 CAPSULE ORAL
Qty: 0 | Refills: 0 | DISCHARGE

## 2023-12-14 RX ORDER — TOFACITINIB 11 MG/1
1 TABLET, FILM COATED, EXTENDED RELEASE ORAL
Qty: 0 | Refills: 0 | DISCHARGE

## 2023-12-14 RX ORDER — ONDANSETRON 8 MG/1
4 TABLET, FILM COATED ORAL ONCE
Refills: 0 | Status: DISCONTINUED | OUTPATIENT
Start: 2023-12-14 | End: 2023-12-14

## 2023-12-14 RX ORDER — ATORVASTATIN CALCIUM 80 MG/1
20 TABLET, FILM COATED ORAL AT BEDTIME
Refills: 0 | Status: DISCONTINUED | OUTPATIENT
Start: 2023-12-14 | End: 2023-12-15

## 2023-12-14 RX ORDER — METHOTREXATE 2.5 MG/1
4 TABLET ORAL
Qty: 0 | Refills: 0 | DISCHARGE

## 2023-12-14 RX ORDER — SENNA PLUS 8.6 MG/1
2 TABLET ORAL AT BEDTIME
Refills: 0 | Status: DISCONTINUED | OUTPATIENT
Start: 2023-12-14 | End: 2023-12-15

## 2023-12-14 RX ORDER — ACETAMINOPHEN 500 MG
2 TABLET ORAL
Qty: 0 | Refills: 0 | DISCHARGE

## 2023-12-14 RX ORDER — SODIUM CHLORIDE 9 MG/ML
100 INJECTION INTRAMUSCULAR; INTRAVENOUS; SUBCUTANEOUS
Refills: 0 | Status: COMPLETED | OUTPATIENT
Start: 2023-12-14 | End: 2023-12-14

## 2023-12-14 RX ORDER — LETROZOLE 2.5 MG/1
2.5 TABLET, FILM COATED ORAL DAILY
Refills: 0 | Status: DISCONTINUED | OUTPATIENT
Start: 2023-12-14 | End: 2023-12-15

## 2023-12-14 RX ORDER — REMDESIVIR 5 MG/ML
200 INJECTION INTRAVENOUS ONCE
Refills: 0 | Status: COMPLETED | OUTPATIENT
Start: 2023-12-14 | End: 2023-12-14

## 2023-12-14 RX ORDER — ACETAMINOPHEN 500 MG
975 TABLET ORAL EVERY 6 HOURS
Refills: 0 | Status: DISCONTINUED | OUTPATIENT
Start: 2023-12-14 | End: 2023-12-15

## 2023-12-14 RX ORDER — METOPROLOL TARTRATE 50 MG
1 TABLET ORAL
Qty: 0 | Refills: 0 | DISCHARGE

## 2023-12-14 RX ORDER — REMDESIVIR 5 MG/ML
100 INJECTION INTRAVENOUS ONCE
Refills: 0 | Status: COMPLETED | OUTPATIENT
Start: 2023-12-15 | End: 2023-12-15

## 2023-12-14 RX ORDER — LETROZOLE 2.5 MG/1
1 TABLET, FILM COATED ORAL
Refills: 0 | DISCHARGE

## 2023-12-14 RX ORDER — CALCIUM CARBONATE 500(1250)
2 TABLET ORAL
Qty: 0 | Refills: 0 | DISCHARGE

## 2023-12-14 RX ORDER — POLYETHYLENE GLYCOL 3350 17 G/17G
17 POWDER, FOR SOLUTION ORAL DAILY
Refills: 0 | Status: DISCONTINUED | OUTPATIENT
Start: 2023-12-14 | End: 2023-12-15

## 2023-12-14 RX ORDER — ROSUVASTATIN CALCIUM 5 MG/1
1 TABLET ORAL
Qty: 0 | Refills: 0 | DISCHARGE

## 2023-12-14 RX ORDER — LEUCOVORIN CALCIUM 5 MG
1 TABLET ORAL
Qty: 0 | Refills: 0 | DISCHARGE

## 2023-12-14 RX ORDER — PREGABALIN 225 MG/1
1 CAPSULE ORAL
Qty: 0 | Refills: 0 | DISCHARGE

## 2023-12-14 RX ORDER — PANTOPRAZOLE SODIUM 20 MG/1
40 TABLET, DELAYED RELEASE ORAL
Refills: 0 | Status: DISCONTINUED | OUTPATIENT
Start: 2023-12-14 | End: 2023-12-15

## 2023-12-14 RX ORDER — CHOLECALCIFEROL (VITAMIN D3) 125 MCG
1 CAPSULE ORAL
Qty: 0 | Refills: 0 | DISCHARGE

## 2023-12-14 RX ORDER — METOCLOPRAMIDE HCL 10 MG
10 TABLET ORAL ONCE
Refills: 0 | Status: COMPLETED | OUTPATIENT
Start: 2023-12-14 | End: 2023-12-14

## 2023-12-14 RX ORDER — REMDESIVIR 5 MG/ML
100 INJECTION INTRAVENOUS ONCE
Refills: 0 | Status: DISCONTINUED | OUTPATIENT
Start: 2023-12-16 | End: 2023-12-15

## 2023-12-14 RX ORDER — PANTOPRAZOLE SODIUM 20 MG/1
1 TABLET, DELAYED RELEASE ORAL
Qty: 0 | Refills: 0 | DISCHARGE

## 2023-12-14 RX ORDER — ONDANSETRON 8 MG/1
4 TABLET, FILM COATED ORAL EVERY 6 HOURS
Refills: 0 | Status: DISCONTINUED | OUTPATIENT
Start: 2023-12-14 | End: 2023-12-15

## 2023-12-14 RX ORDER — POLYETHYLENE GLYCOL 3350 17 G/17G
17 POWDER, FOR SOLUTION ORAL ONCE
Refills: 0 | Status: COMPLETED | OUTPATIENT
Start: 2023-12-14 | End: 2023-12-14

## 2023-12-14 RX ORDER — METHOTREXATE 2.5 MG/1
5 TABLET ORAL
Refills: 0 | DISCHARGE

## 2023-12-14 RX ORDER — ACETAMINOPHEN 500 MG
650 TABLET ORAL EVERY 6 HOURS
Refills: 0 | Status: DISCONTINUED | OUTPATIENT
Start: 2023-12-14 | End: 2023-12-15

## 2023-12-14 RX ADMIN — ONDANSETRON 4 MILLIGRAM(S): 8 TABLET, FILM COATED ORAL at 16:36

## 2023-12-14 RX ADMIN — HEPARIN SODIUM 800 UNIT(S)/HR: 5000 INJECTION INTRAVENOUS; SUBCUTANEOUS at 09:12

## 2023-12-14 RX ADMIN — POLYETHYLENE GLYCOL 3350 17 GRAM(S): 17 POWDER, FOR SOLUTION ORAL at 12:25

## 2023-12-14 RX ADMIN — SODIUM CHLORIDE 310 MILLILITER(S): 9 INJECTION INTRAMUSCULAR; INTRAVENOUS; SUBCUTANEOUS at 09:19

## 2023-12-14 RX ADMIN — Medication 75 MILLIGRAM(S): at 08:34

## 2023-12-14 RX ADMIN — Medication 75 MILLIGRAM(S): at 22:00

## 2023-12-14 RX ADMIN — Medication 10 MILLIGRAM(S): at 23:34

## 2023-12-14 RX ADMIN — Medication 75 MILLIGRAM(S): at 00:38

## 2023-12-14 RX ADMIN — ATORVASTATIN CALCIUM 20 MILLIGRAM(S): 80 TABLET, FILM COATED ORAL at 22:00

## 2023-12-14 RX ADMIN — HEPARIN SODIUM 700 UNIT(S)/HR: 5000 INJECTION INTRAVENOUS; SUBCUTANEOUS at 23:20

## 2023-12-14 RX ADMIN — HEPARIN SODIUM 4000 UNIT(S): 5000 INJECTION INTRAVENOUS; SUBCUTANEOUS at 01:18

## 2023-12-14 RX ADMIN — HEPARIN SODIUM 0 UNIT(S)/HR: 5000 INJECTION INTRAVENOUS; SUBCUTANEOUS at 07:59

## 2023-12-14 RX ADMIN — SENNA PLUS 2 TABLET(S): 8.6 TABLET ORAL at 22:00

## 2023-12-14 RX ADMIN — HEPARIN SODIUM 700 UNIT(S)/HR: 5000 INJECTION INTRAVENOUS; SUBCUTANEOUS at 16:37

## 2023-12-14 RX ADMIN — REMDESIVIR 200 MILLIGRAM(S): 5 INJECTION INTRAVENOUS at 08:34

## 2023-12-14 RX ADMIN — LETROZOLE 2.5 MILLIGRAM(S): 2.5 TABLET, FILM COATED ORAL at 12:21

## 2023-12-14 RX ADMIN — HEPARIN SODIUM 700 UNIT(S)/HR: 5000 INJECTION INTRAVENOUS; SUBCUTANEOUS at 19:38

## 2023-12-14 RX ADMIN — PANTOPRAZOLE SODIUM 40 MILLIGRAM(S): 20 TABLET, DELAYED RELEASE ORAL at 05:35

## 2023-12-14 RX ADMIN — HEPARIN SODIUM 1000 UNIT(S)/HR: 5000 INJECTION INTRAVENOUS; SUBCUTANEOUS at 01:19

## 2023-12-14 RX ADMIN — POLYETHYLENE GLYCOL 3350 17 GRAM(S): 17 POWDER, FOR SOLUTION ORAL at 21:59

## 2023-12-14 NOTE — H&P ADULT - PROBLEM SELECTOR PLAN 2
RVP positive for influenza AH3 on admission.  - Start Tamiflu 75 mg BID x5 days  - Supportive care with PO Tylenol PRN for fever and Tessalon Perle PRN for cough  - CTA chest with IV contrast and CXR with no evidence of PNA at this time  - Droplet precautions

## 2023-12-14 NOTE — H&P ADULT - PROBLEM SELECTOR PLAN 10
Dx in 2022, s/p lumpectomy and RT, now on letrozole.   - C/w letrozole 2.5 mg daily, as it has low association with VTE  - C/w outpatient surveillance

## 2023-12-14 NOTE — H&P ADULT - HISTORY OF PRESENT ILLNESS
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 Pt left on Nov 17th for a pilgrimage to [place], but prior to arriving at [place], also visited [place] and [place]. About 2 weeks into the trip, around Nov 30th, pt started having subjective fever and chills along with a nonproductive cough, nausea, and vomiting. Pt continued to have symptoms when she returned home on Dec 2nd, testing positive for COVID-19 later that day using a rapid home testing kit. Pt was subsequently prescribed Paxlovid by her PCP, around Dec 4th, and saw improvement in her symptoms after completing a 5-day course. However, about 4-5 days ago, pt began experiencing fever and chills again, but also started   68-year-old female history of hypertension, hyperlipidemia, RA, history of left breast DCIS status postlumpectomy/radiation, presenting to ED with chest tightness.  Patient tested positive for COVID on 12/2 while traveling from [place] to [facility] at that time. symptoms started 11/29.  Patient states that she came home and saw her primary care doctor was given Paxlovid which relieved her symptoms however 3 days ago she redeveloped fevers max temp 100.4 at home, persistent cough. Saw her PCP and was started on doxycycline (taken 4 doses so far). And over the past few days admits to midsternal chest tightness.  Denies any associated shortness of breath, nausea, diaphoresis, palpitations.  No history of DVT/PE. Of note, pt also admits to rolling her L ankle, in which she had xrays done but is "unsure" of the restuls nad would like them to be repeated. admits to significant pain with ambulation. Denies numbness, tingling, dizziness, chest pain, shortness of breath, abd pain. has been taking zofran for nausea. 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 Pt left on Nov 17th for a pilgrimage to [place], but prior to arriving at [place], also visited [place] and [place]. About 2 weeks into the trip, around Nov 30th, pt started having subjective fever and chills along with a nonproductive cough, nausea, and vomiting. Pt continued to have symptoms when she returned home on Dec 2nd, testing positive for COVID-19 later that day using a rapid home testing kit. Pt was subsequently prescribed Paxlovid by her PCP, around Dec 4th, and saw improvement in her symptoms after completing a 5-day course. However, about 4-5 days ago, pt began experiencing fever and chills again, but also started   68-year-old female history of hypertension, hyperlipidemia, RA, history of left breast DCIS status postlumpectomy/radiation, presenting to ED with chest tightness.  Patient tested positive for COVID on 12/2 while traveling from [place] to [facility] at that time. symptoms started 11/29.  Patient states that she came home and saw her primary care doctor was given Paxlovid which relieved her symptoms however 3 days ago she redeveloped fevers max temp 100.4 at home, persistent cough. Saw her PCP and was started on doxycycline (taken 4 doses so far). And over the past few days admits to midsternal chest tightness.  Denies any associated shortness of breath, nausea, diaphoresis, palpitations.  No history of DVT/PE. Of note, pt also admits to rolling her L ankle, in which she had xrays done but is "unsure" of the restuls nad would like them to be repeated. admits to significant pain with ambulation. Denies numbness, tingling, dizziness, chest pain, shortness of breath, abd pain. has been taking zofran for nausea. 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 Pt left on Nov 17th for a pilgrimage to [place], but prior to arriving at [place], also visited [place] and [place]. About 2 weeks into the trip, around Nov 30th, pt started having subjective fever and chills along with a nonproductive cough, nausea, and vomiting. Pt continued to have symptoms when she returned home on Dec 2nd, testing positive for COVID-19 later that day using a rapid home testing kit. Pt was subsequently prescribed Paxlovid by her PCP, around Dec 4th, and saw improvement in her symptoms after completing a 5-day course. However, about 4-5 days ago, pt began experiencing fever and chills again and also started having chest heaviness localized to the area around the xiphoid process. No associated palpitations, shortness of breath, hemoptysis, lightheadedness, dizziness, syncope, or LE swelling. Pt was started on doxycycline 100 mg BID on Monday, having taken a total of 4 doses so far without any improvement in her symptoms. Pt was, therefore, referred to the ED by her PCP for further evaluation. Of note, pt sustained a left ankle sprain on Nov 28th after stepping on a hole and rolling her ankle. Although the swelling has subsided, pt continues to have pain her left ankle and has to use a crutch to ambulate, as placing weight on the ankle exacerbates the pain. Pt normally ambulates without any assistance. Pt denies any abdominal pain, diarrhea, constipation, melena, BRBPR, urinary symptoms, or falls. Pt has been having regular check ups for left breast DCIS and is current with other cancer screenings, including PAP smear and colonoscopy.    In the ED,  T 98.1-98.9, HR 69-72, -122/58-76, RR 16, SpO2 % RA.  CTA chest with IV contrast showing acute subsegmental PE in RED and RLL, no evidence of R heart strain.  Started on heparin gtt.   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 Pt left on Nov 17th for a pilgrimage to [place], but prior to arriving at [place], also visited [place] and [place]. About 2 weeks into the trip, around Nov 30th, pt started having subjective fever and chills along with a nonproductive cough, nausea, and vomiting. Pt continued to have symptoms when she returned home on Dec 2nd, testing positive for COVID-19 later that day using a rapid home testing kit. Pt was subsequently prescribed Paxlovid by her PCP, around Dec 4th, and saw improvement in her symptoms after completing a 5-day course. However, about 4-5 days ago, pt began experiencing fever and chills again and also started having chest heaviness localized to the area around the xiphoid process. No associated palpitations, shortness of breath, hemoptysis, lightheadedness, dizziness, syncope, or LE swelling. Pt was started on doxycycline 100 mg BID on Monday, having taken a total of 4 doses so far without any improvement in her symptoms. Pt was, therefore, referred to the ED by her PCP for further evaluation. Of note, pt sustained a left ankle sprain on Nov 28th after stepping on a hole and rolling her ankle. Although the swelling has subsided, pt continues to have pain her left ankle and has to use a crutch to ambulate, as placing weight on the ankle exacerbates the pain. Pt normally ambulates without any assistance. Pt denies any abdominal pain, diarrhea, constipation, melena, BRBPR, urinary symptoms, or falls. Pt has been having regular check ups for left breast DCIS and is current with other cancer screenings, including PAP smear and colonoscopy.    In the ED,  T 98.1-98.9, HR 69-72, -122/58-76, RR 16, SpO2 % RA.  CTA chest with IV contrast showing acute subsegmental PE in RED and RLL, no evidence of R heart strain.  Started on heparin gtt.   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 Pt left on Nov 17th for a pilgrimage to [place], but prior to arriving at [place], also visited [place] and [place]. About 2 weeks into the trip, around Nov 30th, pt started having subjective fever and chills along with a nonproductive cough, nausea, and vomiting. Pt continued to have symptoms when she returned home on Dec 2nd, testing positive for COVID-19 later that day using a rapid home testing kit. Pt was subsequently prescribed Paxlovid by her PCP, around Dec 4th, and saw improvement in her symptoms after completing a 5-day course. However, about 4-5 days ago, pt began experiencing fever and chills again and also started having chest heaviness localized to the area around the xiphoid process. No associated palpitations, shortness of breath, hemoptysis, lightheadedness, dizziness, syncope, or LE swelling. Pt was started on doxycycline 100 mg BID on Monday, having taken a total of 4 doses so far without any improvement in her symptoms. Pt was, therefore, referred to the ED by her PCP for further evaluation. Of note, pt sustained a left ankle sprain on Nov 28th after stepping on a hole and rolling her ankle. Although the swelling has subsided, pt continues to have pain in her left ankle and has to use a crutch to ambulate, as placing weight on the ankle exacerbates the pain. Pt normally ambulates without any assistance. Pt denies any abdominal pain, diarrhea, constipation, melena, BRBPR, urinary symptoms, or falls. Pt has been having regular check ups for left breast DCIS and is current with other cancer screenings, including PAP smear and colonoscopy.    In the ED,  T 98.1-98.9, HR 69-72, -122/58-76, RR 16, SpO2 % RA.  CTA chest with IV contrast showing acute subsegmental PE in RED and RLL, no evidence of R heart strain.  Started on heparin gtt.  Positive for influenza AH3. 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 Pt left on Nov 17th for a pilgrimage to [place], but prior to arriving at [place], also visited [place] and [place]. About 2 weeks into the trip, around Nov 30th, pt started having subjective fever and chills along with a nonproductive cough, nausea, and vomiting. Pt continued to have symptoms when she returned home on Dec 2nd, testing positive for COVID-19 later that day using a rapid home testing kit. Pt was subsequently prescribed Paxlovid by her PCP, around Dec 4th, and saw improvement in her symptoms after completing a 5-day course. However, about 4-5 days ago, pt began experiencing fever and chills again and also started having chest heaviness localized to the area around the xiphoid process. No associated palpitations, shortness of breath, hemoptysis, lightheadedness, dizziness, syncope, or LE swelling. Pt was started on doxycycline 100 mg BID on Monday, having taken a total of 4 doses so far without any improvement in her symptoms. Pt was, therefore, referred to the ED by her PCP for further evaluation. Of note, pt sustained a left ankle sprain on Nov 28th after stepping on a hole and rolling her ankle. Although the swelling has subsided, pt continues to have pain in her left ankle and has to use a crutch to ambulate, as placing weight on the ankle exacerbates the pain. Pt normally ambulates without any assistance. Pt denies any abdominal pain, diarrhea, constipation, melena, BRBPR, urinary symptoms, or falls. Pt has been having regular check ups for left breast DCIS and is current with other cancer screenings, including PAP smear and colonoscopy.    In the ED,  T 98.1-98.9, HR 69-72, -122/58-76, RR 16, SpO2 % RA.  CTA chest with IV contrast showing acute subsegmental PE in RED and RLL, no evidence of R heart strain.  Started on heparin gtt.  Positive for influenza AH3.

## 2023-12-14 NOTE — ED ADULT NURSE REASSESSMENT NOTE - NS ED NURSE REASSESS COMMENT FT1
patient laying in semi fowlers position on the stretcher.  patient alert and oriented times four. patient denies shortness of breath, nausea, vomiting, chill, fever. Vitals unremarkable.  Patient endorses chest pressure 2/10 that stays in the middle of her chest that has been going on for over a week as per chief complaint and only on inspiration P.A Lanik aware. EKG completed and in chart. Respirations equal and adequate. Lung sounds clear. Patients IV patent, no signs of infiltration. A second IV was placed prior to the insertion of heparin. Patient has a 20 gauge in right and left a.c.  Patient started on heparin, as per emr.  Safety measures in place, call bell within reach. Patient stable upon leaving the room. patient laying in semi fowlers position on the stretcher.  patient alert and oriented times four. patient denies shortness of breath, nausea, vomiting, chill, fever. Vitals unremarkable.  Patient endorses chest tightness not pain that stays in the middle of her chest that has been going on for over a week as per chief complaint and only on inspiration P.A Lanik aware. EKG completed and in chart. Respirations equal and adequate. Lung sounds clear. Patients IV patent, no signs of infiltration. A second IV was placed prior to the insertion of heparin. Patient has a 20 gauge in right and left a.c.  Patient started on heparin, as per emr.  Safety measures in place, call bell within reach. Patient stable upon leaving the room.

## 2023-12-14 NOTE — PATIENT PROFILE ADULT - STATED REASON FOR ADMISSION
Discharge instructions reviewed with patient and patient's family. All questions answered. All belongings returned. Patient discharged home with family in stable condition.    covid + flu

## 2023-12-14 NOTE — H&P ADULT - PROBLEM SELECTOR PLAN 1
Pt with 4-5 days of substernal chest heaviness, found to have acute subsegmental PE in RED and RLL on CTA chest with IV contrast. Etiology likely multifactorial, including from prolonged travel, COVID-19, Rinvoq use, and possibly even from rheumatoid arthritis.   - Started on heparin gtt in ED for AC. C/w heparin gtt for now. Will need to be switched to DOAC prior to discharge.   - CTA chest with IV contrast without evidence of right heart strain. Pro-BNP 83, hs-trop 7. TTE ordered to confirm absence of right heart strain.  - B/l LE venous dopplers negative for DVT  - Hold Rinvoq use for now, as Rinvoq associated with increased risk of VTE. Will consult Rheumatology in AM ([facility] Dr. Aletha Toro).   - Hematology consult during admission vs. outpatient, as pt might need to be on lifelong AC if pt needs to be continued on Rinvoq and if rheumatoid arthritis contributed to PE. Pt also with history of DCIS.

## 2023-12-14 NOTE — PATIENT PROFILE ADULT - FUNCTIONAL ASSESSMENT - BASIC MOBILITY 6.
3-calculated by average/Not able to assess (calculate score using [facility] averaging method)  3-calculated by average/Not able to assess (calculate score using [facility] averaging method)

## 2023-12-14 NOTE — DISCHARGE NOTE PROVIDER - NSDCCPCAREPLAN_GEN_ALL_CORE_FT
PRINCIPAL DISCHARGE DIAGNOSIS  Diagnosis: Acute pulmonary embolus  Assessment and Plan of Treatment: Continue with eliquis and follow up with the hematologist, Dr. Orlando. Hold Rinvoq for now and follow up with Rehumatology for when to resume      SECONDARY DISCHARGE DIAGNOSES  Diagnosis: Influenza A  Assessment and Plan of Treatment: Continue tamiflu    Diagnosis: 2019 novel coronavirus disease (COVID-19)  Assessment and Plan of Treatment: Continue to stay hydrated and take cough medication as needed    Diagnosis: Pulmonary nodule, left  Assessment and Plan of Treatment: Please follow up with Oncologist and obtain a repeat CT in 3-6 months for monitoring, unless otherwise advised by the Oncologist.    Diagnosis: Rheumatoid arthritis  Assessment and Plan of Treatment: Hold Rinvoq and follow up with Rheumaotlogist outpatient    Diagnosis: Left ankle pain  Assessment and Plan of Treatment: Follow up with [facility] as scheduled

## 2023-12-14 NOTE — H&P ADULT - NSICDXPASTMEDICALHX_GEN_ALL_CORE_FT
PAST MEDICAL HISTORY:  Anemia     Appendicitis     GERD (gastroesophageal reflux disease)     Hyperlipidemia     Hypertension     Intraductal carcinoma in situ of left breast     Rheumatoid arthritis

## 2023-12-14 NOTE — H&P ADULT - PROBLEM SELECTOR PLAN 11
CTA chest with IV contrast on admission revealing a solitary 8 mm RED pulmonary nodule. Pt with no history of smoking.  - Follow-up CT chest at 6-12 months, then at 18-24 months if no change.

## 2023-12-14 NOTE — H&P ADULT - PROBLEM SELECTOR PLAN 3
RVP also positive for COVID-19. However, pt initially tested positive for COVID-19 on 12/2/23 and is s/p 5-day course of Paxlovid as outpatient, so can be Paxlovid rebound vs. viral shedding causing positive test result.  - Will start 3-day course of Remdesivir as pt high risk of progression to severe disease  - Pt does not meet criteria for Decadron, as pt not requiring any supplemental O2 and CTA chest with IV contrast and CXR with no evidence of PNA  - Check inflammatory markers, including CRP, ferritin, and procalcitonin. D-dimer 1505 on admission.  - Supportive care as above  - Airborne and contact precautions

## 2023-12-14 NOTE — CHART NOTE - NSCHARTNOTEFT_GEN_A_CORE
[facility] NIGHT MEDICINE COVERAGE.    DNRDNI & HCP [facility] NIGHT MEDICINE COVERAGE.    Notified by RN, patient requesting to fill out HCP Form and MOLST form. Pt seen at bedside, upon arrival patient comfortably laying down with mask on, stable on RA with hep gtt running. Pt reports substernal chest "heaviness", however unchanged from day of admission and some pain on her left ankle. IV Tylenol ordered for pain control and additional Miralax ordered this evening as patient did not take morning dose d/t nausea. Pt is A&Ox4 and able to vocalize needs and concerns.     HCP Form filled out, patients daughter, Phyllis Zambrano (Cell#: [phone]) to be Primary HCP and pts sister, Jan Jackman (Cell#:[phone]) as secondary HCP. HCP Form placed in chart. MOLST Form discussed with HCP/daughter at bedside. Discussed whether patient would want to be intubated, if they were to decline and/or have respiratory failure/increased WOB despite intervention/inability to protect airway. Discussed with patient whether she would want chest compressions if their heart were to fail. To which patient reports NOT wanting compressions and NOT wanting to be intubated. DNR/DNI ordered. RN made aware. MOLST placed in chart. All questions and concerns answered and addressed.     Alexus Henry PA  Medicine [id number] [facility] NIGHT MEDICINE COVERAGE.    Notified by RN, patient requesting to fill out HCP Form and MOLST form. Pt seen at bedside, upon arrival patient comfortably laying down with mask on, stable on RA with hep gtt running. Pt reports substernal chest "heaviness", however unchanged from day of admission and some pain on her left ankle. IV Tylenol ordered for pain control and additional Miralax ordered this evening as patient did not take morning dose d/t nausea. Pt is A&Ox4 and able to vocalize needs and concerns.     HCP Form filled out, patients daughter, Phyllis Zambrano (Cell#: [phone]) to be Primary HCP and pts sister, Jan Jackman (Cell#:[phone]) as secondary HCP. HCP Form placed in chart. MOLST Form discussed with HCP/daughter at bedside. Discussed whether patient would want to be intubated, if they were to decline and/or have respiratory failure/increased WOB despite intervention/inability to protect airway. Discussed with patient whether she would want chest compressions if their heart were to fail. To which patient reports NOT wanting compressions and NOT wanting to be intubated. DNR/DNI ordered. RN made aware. MOLST placed in chart. All questions and concerns answered and addressed.     Alexus Henry PA  Medicine [id number]

## 2023-12-14 NOTE — ED ADULT NURSE REASSESSMENT NOTE - NS ED NURSE REASSESS COMMENT FT1
patient laying in semi fowlers position on the stretcher. patient alert and oriented times four. patient denies shortness of breath, chest pain, nausea, vomiting, chill, fever. Respirations equal and adequate. Patients IV patent, no signs of infiltration. no signs of bleeding. Safety measures in place, call bell within reach. Patient stable upon leaving the room.

## 2023-12-14 NOTE — H&P ADULT - ASSESSMENT
69 yo woman with history of rheumatoid arthritis (on Rinvoq and methotrexate), HTN, HLD, GERD, left breast DCIS (dx 2022) s/p lumpectomy and RT (now on letrozole), and left TKA (2019 at [facility]) presents with 2 weeks of fever and chills and 4-5 days of chest heaviness, found to have acute subsegmental PE in RED and RLL and influenza AH3 in setting of recent diagnosis of COVID-19. 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

## 2023-12-14 NOTE — H&P ADULT - PROBLEM SELECTOR PLAN 6
Pt on daily Rinvoq and weekly methotrexate with leucovorin. Currently without any evidence of flare.   - Hold Rinvoq use for now, as Rinvoq associated with increased risk of VTE. Will consult Rheumatology in AM ([facility] Dr. Aletha Toro).  - F/u with Rheumatology as to whether methotrexate can be safely continued if pt with bicytopenia  - Pain control with PO Tylenol PRN  - PT consult

## 2023-12-14 NOTE — PATIENT PROFILE ADULT - FALL HARM RISK - HARM RISK INTERVENTIONS
Assistance OOB with selected safe patient handling equipment/Communicate Risk of Fall with Harm to all staff/Discuss with provider need for PT consult/Monitor gait and stability/Provide patient with walking aids - walker, cane, crutches/Reinforce activity limits and safety measures with patient and family/Tailored Fall Risk Interventions/Visual Cue: Yellow wristband and red socks/Bed in lowest position, wheels locked, appropriate side rails in place/Call bell, personal items and telephone in reach/Instruct patient to call for assistance before getting out of bed or chair/Non-slip footwear when patient is out of bed/[place] to call system/Physically safe environment - no spills, clutter or unnecessary equipment/Purposeful Proactive Rounding/Room/bathroom lighting operational, light cord in reach Assistance OOB with selected safe patient handling equipment/Communicate Risk of Fall with Harm to all staff/Discuss with provider need for PT consult/Monitor gait and stability/Provide patient with walking aids - walker, cane, crutches/Reinforce activity limits and safety measures with patient and family/Tailored Fall Risk Interventions/Visual Cue: Yellow wristband and red socks/Bed in lowest position, wheels locked, appropriate side rails in place/Call bell, personal items and telephone in reach/Instruct patient to call for assistance before getting out of bed or chair/Non-slip footwear when patient is out of bed/[place] to call system/Physically safe environment - no spills, clutter or unnecessary equipment/Purposeful Proactive Rounding/Room/bathroom lighting operational, light cord in reach

## 2023-12-14 NOTE — H&P ADULT - PROBLEM SELECTOR PLAN 5
On admission, pt with leukopenia/neutropenia with WBC 3.24 and ANC 1.74 as well as anemia with Hgb 10.9. Unknown baseline. Pt on weekly methotrexate for rheumatoid arthritis. Leukopenia/neutropenia likely in setting of methotrexate use, possibly exacerbated by current infections with flu and COVID-19. Anemia also likely in setting of methotrexate use and possibly from anemia of chronic disease.   - Monitor CBC with diff  - Leukopenia/neutropenia: Pt currently afebrile and off antibiotics at this time. No S/S of superimposed bacterial infection, including bacterial PNA. If pt has persistent fevers over next 48-72 hours or becomes severely neutropenic, low threshold to start IV antibiotics. F/u procalcitonin.   - Anemia: No S/S of active bleed at this time. Check iron studies, folate, and vitamin B12.  - F/u with Rheumatology as to whether methotrexate can be safely continued if pt with bicytopenia

## 2023-12-14 NOTE — DISCHARGE NOTE PROVIDER - NSDCFUADDAPPT_GEN_ALL_CORE_FT
CTA chest with IV contrast shows solitary 8 mm RED pulmonary nodule.   Please follow up with Oncologist and Follow-up CT chest at 3-6 months, then at 12-18 months if no change. Unless otherwise advised by Oncologist.    CTA chest with IV contrast shows solitary 8 mm RED pulmonary nodule.   Please follow up with Oncologist and Follow-up CT chest at 3-6 months, then at 12-18 months if no change. Unless otherwise advised by Oncologist.  Follow up with your hematologist within 2 weeks.   Follow up with your rheumatologist within 1-2 weeks.

## 2023-12-14 NOTE — DISCHARGE NOTE PROVIDER - NSDCMRMEDTOKEN_GEN_ALL_CORE_FT
amlodipine-benazepril 5 mg-20 mg oral capsule: 1 cap(s) orally once a day  doxycycline hyclate 100 mg oral capsule: 1 cap(s) orally 2 times a day  Eliquis Starter Pack for Treatment of DVT and PE 5 mg oral tablet: 1 tab(s) orally 2 times a day price check only please  letrozole 2.5 mg oral tablet: 1 tab(s) orally once a day  leucovorin 10 mg oral tablet: 1 tab(s) orally once a week  methotrexate 2.5 mg oral tablet: 5 tab(s) orally once a week  pantoprazole 40 mg oral delayed release tablet: 1 tab(s) orally once a day  Rinvoq 15 mg oral tablet, extended release: 1 tab(s) orally once a day  rosuvastatin 5 mg oral tablet: 1 tab(s) orally once a day   amlodipine-benazepril 5 mg-20 mg oral capsule: 1 cap(s) orally once a day  Eliquis Starter Pack for Treatment of DVT and PE 5 mg oral tablet: 1 tab(s) orally 2 times a day price check only please  letrozole 2.5 mg oral tablet: 1 tab(s) orally once a day  leucovorin 10 mg oral tablet: 1 tab(s) orally once a week  methotrexate 2.5 mg oral tablet: 5 tab(s) orally once a week  pantoprazole 40 mg oral delayed release tablet: 1 tab(s) orally once a day  rosuvastatin 5 mg oral tablet: 1 tab(s) orally once a day   amlodipine-benazepril 5 mg-20 mg oral capsule: 1 cap(s) orally once a day  Eliquis Starter Pack for Treatment of DVT and PE 5 mg oral tablet: 1 tab(s) orally 2 times a day take 2 tabs (10mg) by mouth twice daily thru 12/21 then take 1 tab (5mg) by mouth twice daily starting on 12/22  letrozole 2.5 mg oral tablet: 1 tab(s) orally once a day  leucovorin 10 mg oral tablet: 1 tab(s) orally once a week  methotrexate 2.5 mg oral tablet: 5 tab(s) orally once a week  oseltamivir 75 mg oral capsule: 1 cap(s) orally every 12 hours  pantoprazole 40 mg oral delayed release tablet: 1 tab(s) orally once a day  rosuvastatin 5 mg oral tablet: 1 tab(s) orally once a day   amlodipine-benazepril 5 mg-20 mg oral capsule: 1 cap(s) orally once a day  Eliquis Starter Pack for Treatment of DVT and PE 5 mg oral tablet: 1 tab(s) orally 2 times a day take 2 tabs (10mg) by mouth twice daily thru 12/21 then take 1 tab (5mg) by mouth twice daily starting on 12/22  letrozole 2.5 mg oral tablet: 1 tab(s) orally once a day  leucovorin 10 mg oral tablet: 1 tab(s) orally once a week  methotrexate 2.5 mg oral tablet: 5 tab(s) orally once a week  oseltamivir 75 mg oral capsule: 1 cap(s) orally every 12 hours  pantoprazole 40 mg oral delayed release tablet: 1 tab(s) orally once a day  Physical therapy: 3 times per week for 2 weeks  rosuvastatin 5 mg oral tablet: 1 tab(s) orally once a day

## 2023-12-14 NOTE — H&P ADULT - NSHPPHYSICALEXAM_GEN_ALL_CORE
Vital Signs Last 24 Hrs  T(C): 37.2 (13 Dec 2023 18:16), Max: 37.2 (13 Dec 2023 14:16)  T(F): 99 (13 Dec 2023 18:16), Max: 99 (13 Dec 2023 18:16)  HR: 82 (13 Dec 2023 18:16) (69 - 82)  BP: 150/79 (13 Dec 2023 18:16) (111/58 - 150/79)  BP(mean): --  RR: 15 (13 Dec 2023 18:16) (15 - 16)  SpO2: 100% (13 Dec 2023 18:16) (99% - 100%)    PHYSICAL EXAM:  General: Awake and alert.  No acute distress.  Head: Normocephalic, atraumatic.    Eyes: PERRL.  EOMI.  No scleral icterus.  No conjunctival pallor.  Mouth: Moist MM.  No oropharyngeal exudates.    Neck: Supple.  Full range of motion.  No JVD.  No LAD.  No thyromegaly.  Trachea midline.    Heart: No reproducible chest pain.  RRR.  Normal S1 and S2.  No murmurs, rubs, or gallops.  No LE edema b/l.   Lungs: Nonlabored breathing.  Good inspiratory effort.  CTAB.  No wheezes, crackles, or rhonchi.    Abdomen: BS+, soft, nontender with no rebound or guarding, nondistended.  No hepatomegaly.   Skin: Warm and dry.  No rashes.  Extremities: No cyanosis.  2+ peripheral pulses b/l.  Musculoskeletal: No joint deformities.  No spinal or paraspinal tenderness.  Neuro: A&Ox3.  CN II-XII intact.  5/5 motor strength in UE and LE b/l.  Tactile sensation intact in UE and LE b/l.  No focal deficits.

## 2023-12-14 NOTE — ED ADULT NURSE REASSESSMENT NOTE - NS ED NURSE REASSESS COMMENT FT1
patient laying in semi fowlers position on the stretcher. patient alert and oriented times four. patient denies shortness of breath, nausea, vomiting, chill, fever. Patient endorses  retrosternal chest pain only upon inspiration, as per chief complaint. EKG in chart. Vitals unremarkable. Respirations equal and adequate. Patients 20 gauge right and left IV patent, no signs of infiltration. Patient tolerating heparin and no other discomforts found. Safety measures in place, call bell within reach. Patient stable upon leaving the room. patient laying in semi fowlers position on the stretcher. patient alert and oriented times four. patient denies shortness of breath, nausea, vomiting, chill, fever. Patient endorses  retrosternal chest pain only upon inspiration, as per chief complaint. EKG in chart. Vitals unremarkable. P.A Lanik aware. Respirations equal and adequate. Lung sounds clear and equal. Patients 20 gauge right and left IV patent, no signs of infiltration. Patient tolerating heparin and no other discomforts found. Patient assisted to the bathroom without any incident. Safety measures in place, call bell within reach. Patient stable upon leaving the room. patient laying in semi fowlers position on the stretcher. patient alert and oriented times four. patient denies shortness of breath, nausea, vomiting, chill, fever. Vitals unremarkable.  Patient endorses chest pressure 2/10 that stays in the middle of her chest that has been going on for over a week as per chief complaint and only on inspiration P.A Lanik aware. EKG completed ad in chart. Patient denies any other discomforts. Respirations equal and adequate. Lung sounds clear. Patients IV patent, no signs of infiltration.  Patient has a 20 gauge in right and left a.c.  Patient tolerating heparin, as per emr. morning labs collected and sent  Safety measures in place, call bell within reach. Patient stable upon leaving the room. patient laying in semi fowlers position on the stretcher. patient alert and oriented times four. patient denies shortness of breath, nausea, vomiting, chill, fever. Vitals unremarkable.  Patient endorses chest tightness 2/10 that stays in the middle of her chest that has been going on for over a week as per chief complaint and only on inspiration P.A Lanik aware. EKG completed ad in chart. Patient denies any other discomforts. Respirations equal and adequate. Lung sounds clear. Patients IV patent, no signs of infiltration.  Patient has a 20 gauge in right and left a.c.  Patient tolerating heparin, as per emr. morning labs collected and sent  Safety measures in place, call bell within reach. Patient stable upon leaving the room. patient laying in semi fowlers position on the stretcher. patient alert and oriented times four. patient denies shortness of breath, nausea, vomiting, chill, fever. Vitals unremarkable.  Patient endorses chest tightness 2/10 that stays in the middle of her chest that has been going on for over a week as per chief complaint and only on inspiration P.A Marla aware. EKG completed ad in chart. Patient denies pain in left ankle. patient able to ambulate. Patient denies any other discomforts. Respirations equal and adequate. Lung sounds clear. Patients IV patent, no signs of infiltration.  Patient has a 20 gauge in right and left a.c.  Patient tolerating heparin, as per emr. morning labs collected and sent  Safety measures in place, call bell within reach. Patient stable upon leaving the room.

## 2023-12-14 NOTE — DISCHARGE NOTE PROVIDER - CARE PROVIDER_API CALL
Aletha Toro  Rheumatology  [address], [address]  [place] [address]  Phone: [phone]  Fax: [phone]  Follow Up Time:    Aletha Toro  Rheumatology  [address]  [place] [address]  Phone: [phone]  Fax: [phone]  Follow Up Time:

## 2023-12-14 NOTE — H&P ADULT - PROBLEM SELECTOR PLAN 7
Pt on amlodipine-benazepril 5-20 mg daily at home. BPs currently in acceptable range on admission.  - Hold home amlodipine-benazepril 5-20 mg daily for now given acute subsegmental PE in RED and RLL and acute infections with flu and COVID-19  - Monitor VS q4hrs

## 2023-12-14 NOTE — H&P ADULT - PROBLEM SELECTOR PLAN 4
Pt with recent left ankle sprain, on 11/28/23. Pt with persistent pain in left ankle and unable to bear weight on it. XR left ankle/tib/fib on admission with no displaced fracture but shows equivocal stress reaction of distal tibia.  - ED to place ankle brace for now  - Pt with history of left TKA in 2019 at [facility]. If hardware compatible with MRI, will obtain MRI LLE to evaluate for possible stress fracture.  - Pain control with PO Tylenol PRN  - PT consult Pt with recent left ankle sprain, on 11/28/23. Pt with persistent pain in left ankle and unable to bear weight on it. XR left ankle/tib/fib on admission with no displaced fracture but shows equivocal stress reaction of distal tibia.  - ED to place ankle brace for now  - Pt with history of left TKA in 2019 at [facility]. If hardware compatible with MRI, will obtain MRI LLE to evaluate for possible stress fracture.  - Pain control with PO Tylenol PRN  - PT consult Pt with recent left ankle sprain, on 11/28/23. Pt with persistent pain in left ankle and unable to bear weight on it. XR left ankle/tib/fib on admission with no displaced fracture but shows equivocal stress reaction of distal tibia.  - ED to place ankle brace for now  - Pt with history of left TKA in 2019 at [facility]. If hardware compatible with MRI, will obtain MRI LLE to evaluate for possible stress fracture.  - Pain control with PO Tylenol PRN  - PT consult  - Fall risk protocol Pt with recent left ankle sprain, on 11/28/23. Pt with persistent pain in left ankle and unable to bear weight on it. XR left ankle/tib/fib on admission with no displaced fracture but shows equivocal stress reaction of distal tibia.  - ED to place ankle brace for now  - Pt with history of left TKA in 2019 at [facility]. If hardware compatible with MRI, will obtain MRI LLE to evaluate for possible stress fracture.  - Pain control with PO Tylenol PRN  - PT consult  - Fall risk protocol

## 2023-12-14 NOTE — H&P ADULT - NSHPLABSRESULTS_GEN_ALL_CORE
EKG personally reviewed.   NSR at 70 bpm, TWI in V4 and V5 <2 mm and in V6 <1 mm, no ST changes, QTc 427 ms.    Labs personally reviewed.                        10.9   3.24  )-----------( 293      ( 13 Dec 2023 18:00 )             35.0     12-13    142  |  105  |  13  ----------------------------<  95  4.5   |  25  |  0.76    Ca    9.1      13 Dec 2023 18:00    TPro  7.3  /  Alb  4.0  /  TBili  0.3  /  DBili  x   /  AST  25  /  ALT  24  /  AlkPhos  97  12-13    Hs-trop 7, pro-BNP 83    Imaging personally reviewed.  ACC: [id number] EXAM:  CT ANGIO CHEST PULM ART [facility]   ORDERED BY: CAMILA SANFORD   PROCEDURE DATE:  12/13/2023    FINDINGS:  LUNGS AND AIRWAYS: Patent central airways.  A solitary 8 mm left upper   lobe pulmonary nodule is noted (301:37)..  PLEURA: No pleural effusion.  MEDIASTINUM AND IVY: No lymphadenopathy.  VESSELS: Acute subsegmental pulmonary emboli in the left upper lobe and   right lower lobe. Main pulmonary arteries of normal caliber.  HEART: Heart size is normal. No pericardial effusion.  CHEST WALL AND LOWER NECK: Bilateral breast biopsy markers.  VISUALIZED UPPER ABDOMEN: Within normal limits.  BONES: Degenerative changes of the spine.    IMPRESSION:  Acute subsegmental pulmonary emboli in the left upper lobe and right   lower lobe. No evidence for right heart strain.    Solitary 8 mm left upper lobe pulmonary nodule. Fleischner society   recommendations for incidental pulmonary nodule follow-up:    >6-8mm:  Non-smoker: Initial follow-up chest CT at 6-12 months then at 18-24   months if no change.  Smoker: Initial follow-up chest CT at 3-6 months then at 9-12 months and   24 months if no change.    Findings discussed with GLORIA Lynn by Dr. Campbell on 12/13/2023 at   11:11 PM  with read back.    ACC: [id number] EXAM:  US DPLX LWR EXT VEINS COMPL BI   ORDERED BY: CAMILA SANFORD   PROCEDURE DATE:  12/13/2023    FINDINGS:  RIGHT:  Normal compressibility of the RIGHT common femoral, femoral and popliteal   veins.  Doppler examination shows normal spontaneous and phasic flow.  No RIGHT calf vein thrombosis is detected.    LEFT:  Normal compressibility of the LEFT common femoral, femoral and popliteal   veins.  Doppler examination shows normal spontaneous and phasic flow.  No LEFT calf vein thrombosis is detected.    IMPRESSION:  No evidence of deep venous thrombosis in either lower extremity.  ACC: [id number] EXAM:  XR TIB FIB AP LAT 2 VIEWS LT   ORDERED BY: CAMILA SANFORD     ACC: [id number] EXAM:  XR ANKLE COMP MIN 3 VIEWS LT   ORDERED BY: CAMILA SANFORD   PROCEDURE DATE:  12/13/2023    FINDINGS:  Left knee arthroplasty appears intact.  Faint, horizontally oriented linear sclerosis within the distal tibia is   equivocal for stress reaction. No joint effusion.  Joint spaces are maintained.  Soft tissues are unremarkable.    IMPRESSION:  No displaced fracture. Equivocal stress reaction of the distal tibia.   This may be further evaluated with MRI as further clinically indicated. EKG personally reviewed.   NSR at 70 bpm, TWI in V4 and V5 <2 mm and in V6 <1 mm, no ST changes, QTc 427 ms.    Labs personally reviewed.                        10.9   3.24  )-----------( 293      ( 13 Dec 2023 18:00 )             35.0     12-13    142  |  105  |  13  ----------------------------<  95  4.5   |  25  |  0.76    Ca    9.1      13 Dec 2023 18:00    TPro  7.3  /  Alb  4.0  /  TBili  0.3  /  DBili  x   /  AST  25  /  ALT  24  /  AlkPhos  97  12-13    Hs-trop 7, pro-BNP 83    Imaging personally reviewed.  ACC: [id number] EXAM:  CT ANGIO CHEST PULM ART [facility]   ORDERED BY: CAMILA SANFORD   PROCEDURE DATE:  12/13/2023    FINDINGS:  LUNGS AND AIRWAYS: Patent central airways.  A solitary 8 mm left upper   lobe pulmonary nodule is noted (301:37)..  PLEURA: No pleural effusion.  MEDIASTINUM AND IVY: No lymphadenopathy.  VESSELS: Acute subsegmental pulmonary emboli in the left upper lobe and   right lower lobe. Main pulmonary arteries of normal caliber.  HEART: Heart size is normal. No pericardial effusion.  CHEST WALL AND LOWER NECK: Bilateral breast biopsy markers.  VISUALIZED UPPER ABDOMEN: Within normal limits.  BONES: Degenerative changes of the spine.    IMPRESSION:  Acute subsegmental pulmonary emboli in the left upper lobe and right   lower lobe. No evidence for right heart strain.    Solitary 8 mm left upper lobe pulmonary nodule. Fleischner society   recommendations for incidental pulmonary nodule follow-up:    >6-8mm:  Non-smoker: Initial follow-up chest CT at 6-12 months then at 18-24   months if no change.  Smoker: Initial follow-up chest CT at 3-6 months then at 9-12 months and   24 months if no change.    Findings discussed with GLORIA Lynn by Dr. Campbell on 12/13/2023 at   11:11 PM  with read back.    ACC: [id number] EXAM:  US DPLX LWR EXT VEINS COMPL BI   ORDERED BY: CAMILA SANFORD   PROCEDURE DATE:  12/13/2023    FINDINGS:  RIGHT:  Normal compressibility of the RIGHT common femoral, femoral and popliteal   veins.  Doppler examination shows normal spontaneous and phasic flow.  No RIGHT calf vein thrombosis is detected.    LEFT:  Normal compressibility of the LEFT common femoral, femoral and popliteal   veins.  Doppler examination shows normal spontaneous and phasic flow.  No LEFT calf vein thrombosis is detected.    IMPRESSION:  No evidence of deep venous thrombosis in either lower extremity.  ACC: [id number] EXAM:  XR TIB FIB AP LAT 2 VIEWS LT   ORDERED BY: CAMILA SANFORD     ACC: [id number] EXAM:  XR ANKLE COMP MIN 3 VIEWS LT   ORDERED BY: CAMILA SANFORD   PROCEDURE DATE:  12/13/2023    FINDINGS:  Left knee arthroplasty appears intact.  Faint, horizontally oriented linear sclerosis within the distal tibia is   equivocal for stress reaction. No joint effusion.  Joint spaces are maintained.  Soft tissues are unremarkable.    IMPRESSION:  No displaced fracture. Equivocal stress reaction of the distal tibia.   This may be further evaluated with MRI as further clinically indicated.

## 2023-12-14 NOTE — PHYSICAL THERAPY INITIAL EVALUATION ADULT - PERTINENT HX OF CURRENT PROBLEM, REHAB EVAL
Patient is a 68 year old Female, PMH stated below, presents with fever/chills/heavy chest, and left ankle pain with ambulation after rolling it; admitted with PE and flu; XRAY Left Ankle: No displaced fracture.

## 2023-12-14 NOTE — H&P ADULT - NSHPREVIEWOFSYSTEMS_GEN_ALL_CORE
Constitutional: +Fever and chills. No generalized weakness or unintentional weight loss.  Eyes: No visual changes, double vision, or eye pain  Ears, Nose, Mouth, Throat: No runny nose, sinus pain, ear pain, tinnitus, sore throat, dysphagia, or odynophagia  Cardiovascular: +Chest heaviness. No palpitations or LE edema.  Respiratory: +Nonproductive cough. No wheezing, hemoptysis, or shortness of breath.  Gastrointestinal: +Nausea and vomiting. No abdominal pain, diarrhea/constipation, hematemesis, melena, or BRBPR.  Genitourinary: No dysuria, frequency, urgency, or hematuria  Musculoskeletal: +Intermittent joint pains from rheumatoid arthritis. No neck pain or back pain.  Skin: No pruritus, rashes, lesions, or wounds  Neurologic: No syncope, seizures, headache, paresthesias, numbness, or limb weakness  Psychiatric: No depression, anxiety, or agitation  Endocrine: No heat/cold intolerance, mood swings, sweats, polydipsia, or polyuria  Hematologic/lymphatic: No purpura, petechia, or prolonged or excessive bleeding after dental extraction / injury  Allergic/Immunologic: No anaphylaxis or allergic response to materials, foods, animals    Positives and pertinent negatives noted and all other systems negative.

## 2023-12-14 NOTE — PHYSICAL THERAPY INITIAL EVALUATION ADULT - ADDITIONAL COMMENTS
Pt lives with sister in a private house with 4 steps to enter. Pt was independent with all ADLs and ambulated without an assistive device at baseline; however she has been recently using one crutch to offload her left ankle.     Pt left semisupine in bed in NAD, all lines intact, and, call bell in reach.

## 2023-12-14 NOTE — DISCHARGE NOTE PROVIDER - HOSPITAL COURSE
69 yo woman with history of rheumatoid arthritis (on Rinvoq and methotrexate), HTN, HLD, GERD, left breast DCIS (dx 2022) s/p lumpectomy and RT (now on letrozole), and left TKA (2019 at [facility]) presents with 2 weeks of fever and chills and 4-5 days of chest heaviness, found to have acute subsegmental PE in RED and RLL and influenza AH3 in setting of recent diagnosis of COVID-19.    Acute pulmonary embolism.   found to have acute subsegmental PE in RED and RLL on CTA chest with IV contrast.   Will transition Hep gtt to eliquis. $30 copay for eliquis, pt agreeable.   Pt hemodynamically stable,  TTE with no acute findings. TTE ordered to confirm absence of right heart strain  Hold Rinvoq use for now, as Rinvoq associated with increased risk of VTE. Plan discussed with outpt [facility] rheumatology Dr. Aletha Toro, she agrees and reecomend follow up outpatient.        COVID-19 and Influenza A.   complete tamiflu outpatient  s/p paxlovid, will not discharge on remdesivir at this time.  c/w Supportive care with PO Tylenol PRN for fever and Tessalon Perle PRN for cough     Left ankle pain.   Pt with recent left ankle sprain, on 11/28/23. Pt with persistent pain in left ankle and unable to bear weight on it. XR left ankle/tib/fib on admission with no displaced fracture but shows equivocal stress reaction of distal tibia.  f/u with outpatient Orthopedics as scheduled. No need for inpt MRI as the pt has established care with Ortho.    Bicytopenia.    pt with leukopenia/neutropenia with WBC 3.24 and ANC 1.74 as well as anemia with Hgb 10.9. Unknown baseline. Pt on weekly methotrexate for rheumatoid arthritis. Leukopenia/neutropenia likely in setting of methotrexate use, possibly exacerbated by current infections with flu and COVID-19. Anemia also likely in setting of methotrexate use and possibly from anemia of chronic disease.   follow up with outpatient hematologist Dr. Orlando     Rheumatoid arthritis.   Hold Rinvoq use for now, as Rinvoq associated with increased risk of VTE. Plan discussed with outpt [facility] rheumatology Dr. Aletha Toro, she agrees and reecomend follow up outpatient.       Hypertension.   c/w BP meds and trend    Hyperlipidemia.   c/w rosuvastatin 5 mg daily     GERD (gastroesophageal reflux disease).   ·C/w PO Protonix 40 mg daily.     Ductal carcinoma in situ (DCIS) of left breast.   ·  Plan; Dx in 2022, s/p lumpectomy and RT, now on letrozole.   - C/w letrozole 2.5 mg daily, as it has low association with VTE  - C/w outpatient surveillance.     Pulmonary nodule, left.    CTA chest with IV contrast on admission revealing a solitary 8 mm RED pulmonary nodule. Pt with no history of smoking.  - Please follow up with Oncologist and Follow-up CT chest at 3-6 months, then at 12-18 months if no change. Unless otherwise advised by Oncologist.     67 yo woman with history of rheumatoid arthritis (on Rinvoq and methotrexate), HTN, HLD, GERD, left breast DCIS (dx 2022) s/p lumpectomy and RT (now on letrozole), and left TKA (2019 at [facility]) presents with 2 weeks of fever and chills and 4-5 days of chest heaviness, found to have acute subsegmental PE in RED and RLL and influenza AH3 in setting of recent diagnosis of COVID-19.    Acute pulmonary embolism.   found to have acute subsegmental PE in RED and RLL on CTA chest with IV contrast.   Will transition Hep gtt to eliquis. $30 copay for eliquis, pt agreeable.   Pt hemodynamically stable,  TTE with no acute findings. TTE ordered to confirm absence of right heart strain  Hold Rinvoq use for now, as Rinvoq associated with increased risk of VTE. Plan discussed with outpt [facility] rheumatology Dr. Aletha Toro, she agrees and reecomend follow up outpatient.        COVID-19 and Influenza A.   complete tamiflu outpatient  s/p paxlovid, will not discharge on remdesivir at this time.  c/w Supportive care with PO Tylenol PRN for fever and Tessalon Perle PRN for cough     Left ankle pain.   Pt with recent left ankle sprain, on 11/28/23. Pt with persistent pain in left ankle and unable to bear weight on it. XR left ankle/tib/fib on admission with no displaced fracture but shows equivocal stress reaction of distal tibia.  f/u with outpatient Orthopedics as scheduled. No need for inpt MRI as the pt has established care with Ortho.    Bicytopenia.    pt with leukopenia/neutropenia with WBC 3.24 and ANC 1.74 as well as anemia with Hgb 10.9. Unknown baseline. Pt on weekly methotrexate for rheumatoid arthritis. Leukopenia/neutropenia likely in setting of methotrexate use, possibly exacerbated by current infections with flu and COVID-19. Anemia also likely in setting of methotrexate use and possibly from anemia of chronic disease.   follow up with outpatient hematologist Dr. Orlando     Rheumatoid arthritis.   Hold Rinvoq use for now, as Rinvoq associated with increased risk of VTE. Plan discussed with outpt [facility] rheumatology Dr. Aletha Toro, she agrees and reecomend follow up outpatient.       Hypertension.   c/w BP meds and trend    Hyperlipidemia.   c/w rosuvastatin 5 mg daily     GERD (gastroesophageal reflux disease).   ·C/w PO Protonix 40 mg daily.     Ductal carcinoma in situ (DCIS) of left breast.   ·  Plan; Dx in 2022, s/p lumpectomy and RT, now on letrozole.   - C/w letrozole 2.5 mg daily, as it has low association with VTE  - C/w outpatient surveillance.     Pulmonary nodule, left.    CTA chest with IV contrast on admission revealing a solitary 8 mm RED pulmonary nodule. Pt with no history of smoking.  - Please follow up with Oncologist and Follow-up CT chest at 3-6 months, then at 12-18 months if no change. Unless otherwise advised by Oncologist.

## 2023-12-14 NOTE — PHYSICAL THERAPY INITIAL EVALUATION ADULT - ACTIVE RANGE OF MOTION EXAMINATION, REHAB EVAL
hernan. upper extremity Active ROM was WNL (within normal limits)/bilateral lower extremity Active ROM was WNL (within normal limits)

## 2023-12-15 ENCOUNTER — TRANSCRIPTION ENCOUNTER (OUTPATIENT)
Age: 68
End: 2023-12-15

## 2023-12-15 VITALS
RESPIRATION RATE: 18 BRPM | OXYGEN SATURATION: 100 % | DIASTOLIC BLOOD PRESSURE: 63 MMHG | SYSTOLIC BLOOD PRESSURE: 107 MMHG | TEMPERATURE: 98 F | HEART RATE: 57 BPM

## 2023-12-15 LAB
APTT BLD: 68 SEC — HIGH (ref 24.5–35.6)
APTT BLD: 68 SEC — HIGH (ref 24.5–35.6)
HCT VFR BLD CALC: 31.7 % — LOW (ref 34.5–45)
HCT VFR BLD CALC: 31.7 % — LOW (ref 34.5–45)
HCV AB S/CO SERPL IA: 0.07 S/CO — SIGNIFICANT CHANGE UP (ref 0–0.99)
HCV AB S/CO SERPL IA: 0.07 S/CO — SIGNIFICANT CHANGE UP (ref 0–0.99)
HCV AB SERPL-IMP: SIGNIFICANT CHANGE UP
HCV AB SERPL-IMP: SIGNIFICANT CHANGE UP
HGB BLD-MCNC: 10.2 G/DL — LOW (ref 11.5–15.5)
HGB BLD-MCNC: 10.2 G/DL — LOW (ref 11.5–15.5)
MCHC RBC-ENTMCNC: 27.5 PG — SIGNIFICANT CHANGE UP (ref 27–34)
MCHC RBC-ENTMCNC: 27.5 PG — SIGNIFICANT CHANGE UP (ref 27–34)
MCHC RBC-ENTMCNC: 32.2 GM/DL — SIGNIFICANT CHANGE UP (ref 32–36)
MCHC RBC-ENTMCNC: 32.2 GM/DL — SIGNIFICANT CHANGE UP (ref 32–36)
MCV RBC AUTO: 85.4 FL — SIGNIFICANT CHANGE UP (ref 80–100)
MCV RBC AUTO: 85.4 FL — SIGNIFICANT CHANGE UP (ref 80–100)
NRBC # BLD: 0 /100 WBCS — SIGNIFICANT CHANGE UP (ref 0–0)
NRBC # BLD: 0 /100 WBCS — SIGNIFICANT CHANGE UP (ref 0–0)
NRBC # FLD: 0 K/UL — SIGNIFICANT CHANGE UP (ref 0–0)
NRBC # FLD: 0 K/UL — SIGNIFICANT CHANGE UP (ref 0–0)
PLATELET # BLD AUTO: 247 K/UL — SIGNIFICANT CHANGE UP (ref 150–400)
PLATELET # BLD AUTO: 247 K/UL — SIGNIFICANT CHANGE UP (ref 150–400)
RBC # BLD: 3.71 M/UL — LOW (ref 3.8–5.2)
RBC # BLD: 3.71 M/UL — LOW (ref 3.8–5.2)
RBC # FLD: 14.6 % — HIGH (ref 10.3–14.5)
RBC # FLD: 14.6 % — HIGH (ref 10.3–14.5)
WBC # BLD: 2.95 K/UL — LOW (ref 3.8–10.5)
WBC # BLD: 2.95 K/UL — LOW (ref 3.8–10.5)
WBC # FLD AUTO: 2.95 K/UL — LOW (ref 3.8–10.5)
WBC # FLD AUTO: 2.95 K/UL — LOW (ref 3.8–10.5)

## 2023-12-15 PROCEDURE — 99239 HOSP IP/OBS DSCHRG MGMT >30: CPT

## 2023-12-15 RX ORDER — APIXABAN 2.5 MG/1
10 TABLET, FILM COATED ORAL EVERY 12 HOURS
Refills: 0 | Status: DISCONTINUED | OUTPATIENT
Start: 2023-12-15 | End: 2023-12-15

## 2023-12-15 RX ORDER — UPADACITINIB 45 MG/1
1 TABLET, EXTENDED RELEASE ORAL
Refills: 0 | DISCHARGE

## 2023-12-15 RX ORDER — APIXABAN 2.5 MG/1
1 TABLET, FILM COATED ORAL
Qty: 70 | Refills: 0
Start: 2023-12-15 | End: 2024-01-18

## 2023-12-15 RX ADMIN — HEPARIN SODIUM 700 UNIT(S)/HR: 5000 INJECTION INTRAVENOUS; SUBCUTANEOUS at 07:27

## 2023-12-15 RX ADMIN — REMDESIVIR 200 MILLIGRAM(S): 5 INJECTION INTRAVENOUS at 11:24

## 2023-12-15 RX ADMIN — Medication 75 MILLIGRAM(S): at 11:24

## 2023-12-15 RX ADMIN — LETROZOLE 2.5 MILLIGRAM(S): 2.5 TABLET, FILM COATED ORAL at 11:24

## 2023-12-15 RX ADMIN — APIXABAN 10 MILLIGRAM(S): 2.5 TABLET, FILM COATED ORAL at 11:23

## 2023-12-15 RX ADMIN — PANTOPRAZOLE SODIUM 40 MILLIGRAM(S): 20 TABLET, DELAYED RELEASE ORAL at 06:20

## 2023-12-15 NOTE — DISCHARGE NOTE NURSING/CASE MANAGEMENT/SOCIAL WORK - PATIENT PORTAL LINK FT
You can access the FollowMyHealth Patient Portal offered by [facility] by registering at the following website: [email or web address]. By joining [facility]’s FollowMyHealth portal, you will also be able to view your health information using other applications (apps) compatible with our system. You can access the FollowMyHealth Patient Portal offered by [facility] by registering at the following website: [email or web address]. By joining [facility]’s FollowMyHealth portal, you will also be able to view your health information using other applications (apps) compatible with our system.

## 2023-12-15 NOTE — CHART NOTE - NSCHARTNOTEFT_GEN_A_CORE
67 yo woman with history of rheumatoid arthritis (on Rinvoq and methotrexate), HTN, HLD, GERD, left breast DCIS (dx 2022) s/p lumpectomy and RT (now on letrozole), and left TKA (2019 at [facility]) presents with 2 weeks of fever and chills and 4-5 days of chest heaviness, found to have acute subsegmental PE in RED and RLL and influenza AH3 in setting of recent diagnosis of COVID-19.    Gen:: NAD  Lungs: Normal resp, CTAB  CVS;s1s2+    Denies SOB or WIGGINS    Acute pulmonary embolism.   found to have acute subsegmental PE in RED and RLL on CTA chest with IV contrast.   Will transition Hep gtt to eliquis. $30 copay for eliquis, pt agreeable.   Pt hemodynamically stable,  TTE with no acute findings. TTE ordered to confirm absence of right heart strain  Hold Rinvoq use for now, as Rinvoq associated with increased risk of VTE. Plan discussed with outpt [facility] rheumatology Dr. Aletha Toro, she agrees and reecomend follow up outpatient.        COVID-19 and Influenza A.   complete tamiflu outpatient  s/p paxlovid, will not discharge on remdesivir at this time.  c/w Supportive care with PO Tylenol PRN for fever and Tessalon Perle PRN for cough     Left ankle pain.   Pt with recent left ankle sprain, on 11/28/23. Pt with persistent pain in left ankle and unable to bear weight on it. XR left ankle/tib/fib on admission with no displaced fracture but shows equivocal stress reaction of distal tibia.  f/u with outpatient Orthopedics as scheduled. No need for inpt MRI as the pt has established care with Ortho.    Bicytopenia.    pt with leukopenia/neutropenia with WBC 3.24 and ANC 1.74 as well as anemia with Hgb 10.9. Unknown baseline. Pt on weekly methotrexate for rheumatoid arthritis. Leukopenia/neutropenia likely in setting of methotrexate use, possibly exacerbated by current infections with flu and COVID-19. Anemia also likely in setting of methotrexate use and possibly from anemia of chronic disease.   follow up with outpatient hematologist Dr. Orlando     Rheumatoid arthritis.   Hold Rinvoq use for now, as Rinvoq associated with increased risk of VTE. Plan discussed with outpt [facility] rheumatology Dr. Aletha Toro, she agrees and reecomend follow up outpatient.       Hypertension.   c/w BP meds and trend    Hyperlipidemia.   c/w rosuvastatin 5 mg daily     GERD (gastroesophageal reflux disease).   ·C/w PO Protonix 40 mg daily.     Ductal carcinoma in situ (DCIS) of left breast.   ·  Plan; Dx in 2022, s/p lumpectomy and RT, now on letrozole.   - C/w letrozole 2.5 mg daily, as it has low association with VTE  - C/w outpatient surveillance.     Pulmonary nodule, left.    CTA chest with IV contrast on admission revealing a solitary 8 mm RED pulmonary nodule. Pt with no history of smoking.  - Please follow up with Oncologist and Follow-up CT chest at 3-6 months, then at 12-18 months if no change. Unless otherwise advised by Oncologist.    Pt hemodynamically stable for dc.  Susan nd examined 12/15 67 yo woman with history of rheumatoid arthritis (on Rinvoq and methotrexate), HTN, HLD, GERD, left breast DCIS (dx 2022) s/p lumpectomy and RT (now on letrozole), and left TKA (2019 at [facility]) presents with 2 weeks of fever and chills and 4-5 days of chest heaviness, found to have acute subsegmental PE in RED and RLL and influenza AH3 in setting of recent diagnosis of COVID-19.    Gen:: NAD  Lungs: Normal resp, CTAB  CVS;s1s2+    Denies SOB or WIGGINS    Acute pulmonary embolism.   found to have acute subsegmental PE in RED and RLL on CTA chest with IV contrast.   Will transition Hep gtt to eliquis. $30 copay for eliquis, pt agreeable.   Pt hemodynamically stable,  TTE with no acute findings. TTE ordered to confirm absence of right heart strain  Hold Rinvoq use for now, as Rinvoq associated with increased risk of VTE. Plan discussed with outpt [facility] rheumatology Dr. Aletha Toro, she agrees and reecomend follow up outpatient.        COVID-19 and Influenza A.   complete tamiflu outpatient  s/p paxlovid, will not discharge on remdesivir at this time.  c/w Supportive care with PO Tylenol PRN for fever and Tessalon Perle PRN for cough     Left ankle pain.   Pt with recent left ankle sprain, on 11/28/23. Pt with persistent pain in left ankle and unable to bear weight on it. XR left ankle/tib/fib on admission with no displaced fracture but shows equivocal stress reaction of distal tibia.  f/u with outpatient Orthopedics as scheduled. No need for inpt MRI as the pt has established care with Ortho.    Bicytopenia.    pt with leukopenia/neutropenia with WBC 3.24 and ANC 1.74 as well as anemia with Hgb 10.9. Unknown baseline. Pt on weekly methotrexate for rheumatoid arthritis. Leukopenia/neutropenia likely in setting of methotrexate use, possibly exacerbated by current infections with flu and COVID-19. Anemia also likely in setting of methotrexate use and possibly from anemia of chronic disease.   follow up with outpatient hematologist Dr. Orlando     Rheumatoid arthritis.   Hold Rinvoq use for now, as Rinvoq associated with increased risk of VTE. Plan discussed with outpt [facility] rheumatology Dr. Aletha Toro, she agrees and reecomend follow up outpatient.       Hypertension.   c/w BP meds and trend    Hyperlipidemia.   c/w rosuvastatin 5 mg daily     GERD (gastroesophageal reflux disease).   ·C/w PO Protonix 40 mg daily.     Ductal carcinoma in situ (DCIS) of left breast.   ·  Plan; Dx in 2022, s/p lumpectomy and RT, now on letrozole.   - C/w letrozole 2.5 mg daily, as it has low association with VTE  - C/w outpatient surveillance.     Pulmonary nodule, left.    CTA chest with IV contrast on admission revealing a solitary 8 mm RED pulmonary nodule. Pt with no history of smoking.  - Please follow up with Oncologist and Follow-up CT chest at 3-6 months, then at 12-18 months if no change. Unless otherwise advised by Oncologist.    Pt hemodynamically stable for dc.  Susan nd examined 12/15

## 2023-12-15 NOTE — DISCHARGE NOTE NURSING/CASE MANAGEMENT/SOCIAL WORK - NSDCPEFALRISK_GEN_ALL_CORE
For information on Fall & Injury Prevention, visit: [email or web address] OR  [email or web address] OR  https://www.cdc.gov/steadi/patient.html For information on Fall & Injury Prevention, visit: [email or web address] OR  [email or web address] OR  https://www.cdc.gov/steadi/patient.html

## 2023-12-15 NOTE — DISCHARGE NOTE NURSING/CASE MANAGEMENT/SOCIAL WORK - NSDCFUADDAPPT_GEN_ALL_CORE_FT
CTA chest with IV contrast shows solitary 8 mm RED pulmonary nodule.   Please follow up with Oncologist and Follow-up CT chest at 3-6 months, then at 12-18 months if no change. Unless otherwise advised by Oncologist.  Follow up with your hematologist within 2 weeks.   Follow up with your rheumatologist within 1-2 weeks.

## 2024-03-20 NOTE — ASU DISCHARGE PLAN (ADULT/PEDIATRIC) - NO SPORTS/GYM DURATION
[facility]  Physical Therapy Daily Treatment Note  Date: 3/20/2024  Patient Name: Kellie Patricia  : 1972   MRN: [id number]  DOInjury: none  DOSx: none   Referring Provider: SABIHA Boudreaux DO   [facility] [place] [address]     Medical Diagnosis: Left wrist pain (M25.532)     Outcome Measure: QUICK DASH= 34, 52%    S: Patient reports no pain in past week. Dr. Boudreaux wants her to continue through POC or till end of month.  Some burning numbness in hand with therex.  O:  Added triceps ext today.   Time 9231-3722 2x/wk, 6 weeks   Visit     Pain 010     ROM WNL    Modalities     US 1 ,mHZ/1.3 w/cm2/pw to left elbow lateral epicondyle deferred 3/6   THEREX     Wrist curls 1# x 20    Wrist ext 1# x 20    Pronation/supination 1# x 20 each    Wrist UD Green x 20    Wrist RD 1# x 20    Bicep curls 2# x 20    UBE L4 x 2 min FWD  L4 x 2 min Retro    Triceps ext Left G x 20    Functional activities To aid in ROM and strength needed for reaching , lifting ,pushing and pulling at home/work    ROWS: H      ROWS: M     ROWS: L     ER     IR               A:  Tolerated well .    P: Continue with rehab plan.  Odin Bruce PT    Treatment Charges: Mins Units   Initial Evaluation     Re-Evaluation     Ther Exercise         TE 24 2   Manual Therapy     MT     Ther Activities        TA     Gait Training          GT      Neuro Re-education NR     Modalities      Non-Billable Service Time     Other     Total Time/Units 24 2       as per Dr rodríguez

## 2024-10-17 NOTE — H&P PST ADULT - DOES PATIENT HAVE ADVANCE DIRECTIVE
Shannan Reyes discharged to home accompanied by .   Patient provided with the following educational materials upon discharge:  syncope.   Valuables and belongings sent with patient.   discharge summary, discharge instructions, medications, and follow up appointments reviewed with patient and .  Patient and  verbalized understanding    Patient had concerns about primary practice not having her information because she is from [facility]. RN informed patient that although we are different facility from [facility], they will still see what test and procedures were done at [place].   
No

## 2025-05-21 NOTE — ASU DISCHARGE PLAN (ADULT/PEDIATRIC) - CARE PROVIDER_API CALL
Calli Bell)  [facility]  [address] [place] [address]  Phone: [phone]  Fax: [phone]  Follow Up Time: 2 weeks  
(E4) spontaneous

## 2025-09-01 ENCOUNTER — EMERGENCY (EMERGENCY)
Facility: HOSPITAL | Age: 70
LOS: 1 days | End: 2025-09-01
Attending: EMERGENCY MEDICINE | Admitting: EMERGENCY MEDICINE
Payer: MEDICARE

## 2025-09-01 VITALS
HEART RATE: 73 BPM | WEIGHT: 123.02 LBS | SYSTOLIC BLOOD PRESSURE: 113 MMHG | DIASTOLIC BLOOD PRESSURE: 70 MMHG | HEIGHT: 64 IN | TEMPERATURE: 98 F | RESPIRATION RATE: 13 BRPM | OXYGEN SATURATION: 98 %

## 2025-09-01 VITALS
RESPIRATION RATE: 16 BRPM | SYSTOLIC BLOOD PRESSURE: 124 MMHG | HEART RATE: 61 BPM | DIASTOLIC BLOOD PRESSURE: 55 MMHG | TEMPERATURE: 98 F | OXYGEN SATURATION: 100 %

## 2025-09-01 DIAGNOSIS — Z98.890 OTHER SPECIFIED POSTPROCEDURAL STATES: Chronic | ICD-10-CM

## 2025-09-01 DIAGNOSIS — Z96.652 PRESENCE OF LEFT ARTIFICIAL KNEE JOINT: Chronic | ICD-10-CM

## 2025-09-01 DIAGNOSIS — Z90.49 ACQUIRED ABSENCE OF OTHER SPECIFIED PARTS OF DIGESTIVE TRACT: Chronic | ICD-10-CM

## 2025-09-01 LAB
ALBUMIN SERPL ELPH-MCNC: 3.6 G/DL — SIGNIFICANT CHANGE UP (ref 3.3–5)
ALP SERPL-CCNC: 72 U/L — SIGNIFICANT CHANGE UP (ref 40–120)
ALT FLD-CCNC: 12 U/L — SIGNIFICANT CHANGE UP (ref 4–33)
ANION GAP SERPL CALC-SCNC: 9 MMOL/L — SIGNIFICANT CHANGE UP (ref 7–14)
APPEARANCE UR: CLEAR — SIGNIFICANT CHANGE UP
AST SERPL-CCNC: 17 U/L — SIGNIFICANT CHANGE UP (ref 4–32)
BASOPHILS # BLD AUTO: 0.01 K/UL — SIGNIFICANT CHANGE UP (ref 0–0.2)
BASOPHILS NFR BLD AUTO: 0.1 % — SIGNIFICANT CHANGE UP (ref 0–2)
BILIRUB SERPL-MCNC: 0.6 MG/DL — SIGNIFICANT CHANGE UP (ref 0.2–1.2)
BILIRUB UR-MCNC: NEGATIVE — SIGNIFICANT CHANGE UP
BUN SERPL-MCNC: 16 MG/DL — SIGNIFICANT CHANGE UP (ref 7–23)
C DIFF GDH STL QL: NEGATIVE — SIGNIFICANT CHANGE UP
C DIFF GDH STL QL: SIGNIFICANT CHANGE UP
CALCIUM SERPL-MCNC: 8.8 MG/DL — SIGNIFICANT CHANGE UP (ref 8.4–10.5)
CHLORIDE SERPL-SCNC: 106 MMOL/L — SIGNIFICANT CHANGE UP (ref 98–107)
CO2 SERPL-SCNC: 24 MMOL/L — SIGNIFICANT CHANGE UP (ref 22–31)
COLOR SPEC: YELLOW — SIGNIFICANT CHANGE UP
CREAT SERPL-MCNC: 0.68 MG/DL — SIGNIFICANT CHANGE UP (ref 0.5–1.3)
DIFF PNL FLD: NEGATIVE — SIGNIFICANT CHANGE UP
EGFR: 94 ML/MIN/1.73M2 — SIGNIFICANT CHANGE UP
EGFR: 94 ML/MIN/1.73M2 — SIGNIFICANT CHANGE UP
EOSINOPHIL # BLD AUTO: 0.04 K/UL — SIGNIFICANT CHANGE UP (ref 0–0.5)
EOSINOPHIL NFR BLD AUTO: 0.6 % — SIGNIFICANT CHANGE UP (ref 0–6)
GAS PNL BLDV: SIGNIFICANT CHANGE UP
GI PCR PANEL: SIGNIFICANT CHANGE UP
GLUCOSE SERPL-MCNC: 100 MG/DL — HIGH (ref 70–99)
GLUCOSE UR QL: NEGATIVE MG/DL — SIGNIFICANT CHANGE UP
HCT VFR BLD CALC: 40 % — SIGNIFICANT CHANGE UP (ref 34.5–45)
HGB BLD-MCNC: 12.5 G/DL — SIGNIFICANT CHANGE UP (ref 11.5–15.5)
IMM GRANULOCYTES # BLD AUTO: 0.01 K/UL — SIGNIFICANT CHANGE UP (ref 0–0.07)
IMM GRANULOCYTES NFR BLD AUTO: 0.1 % — SIGNIFICANT CHANGE UP (ref 0–0.9)
KETONES UR QL: NEGATIVE MG/DL — SIGNIFICANT CHANGE UP
LEUKOCYTE ESTERASE UR-ACNC: NEGATIVE — SIGNIFICANT CHANGE UP
LIDOCAIN IGE QN: 30 U/L — SIGNIFICANT CHANGE UP (ref 7–60)
LYMPHOCYTES # BLD AUTO: 1.7 K/UL — SIGNIFICANT CHANGE UP (ref 1–3.3)
LYMPHOCYTES NFR BLD AUTO: 25 % — SIGNIFICANT CHANGE UP (ref 13–44)
MCHC RBC-ENTMCNC: 26.7 PG — LOW (ref 27–34)
MCHC RBC-ENTMCNC: 31.3 G/DL — LOW (ref 32–36)
MCV RBC AUTO: 85.3 FL — SIGNIFICANT CHANGE UP (ref 80–100)
MONOCYTES # BLD AUTO: 0.4 K/UL — SIGNIFICANT CHANGE UP (ref 0–0.9)
MONOCYTES NFR BLD AUTO: 5.9 % — SIGNIFICANT CHANGE UP (ref 2–14)
NEUTROPHILS # BLD AUTO: 4.64 K/UL — SIGNIFICANT CHANGE UP (ref 1.8–7.4)
NEUTROPHILS NFR BLD AUTO: 68.3 % — SIGNIFICANT CHANGE UP (ref 43–77)
NITRITE UR-MCNC: NEGATIVE — SIGNIFICANT CHANGE UP
NRBC # BLD AUTO: 0 K/UL — SIGNIFICANT CHANGE UP (ref 0–0)
NRBC # FLD: 0 K/UL — SIGNIFICANT CHANGE UP (ref 0–0)
NRBC BLD AUTO-RTO: 0 /100 WBCS — SIGNIFICANT CHANGE UP (ref 0–0)
PH UR: 6.5 — SIGNIFICANT CHANGE UP (ref 5–8)
PLATELET # BLD AUTO: 163 K/UL — SIGNIFICANT CHANGE UP (ref 150–400)
PMV BLD: 10.1 FL — SIGNIFICANT CHANGE UP (ref 7–13)
POTASSIUM SERPL-MCNC: 3.8 MMOL/L — SIGNIFICANT CHANGE UP (ref 3.5–5.3)
POTASSIUM SERPL-SCNC: 3.8 MMOL/L — SIGNIFICANT CHANGE UP (ref 3.5–5.3)
PROT SERPL-MCNC: 6.9 G/DL — SIGNIFICANT CHANGE UP (ref 6–8.3)
PROT UR-MCNC: NEGATIVE MG/DL — SIGNIFICANT CHANGE UP
RBC # BLD: 4.69 M/UL — SIGNIFICANT CHANGE UP (ref 3.8–5.2)
RBC # FLD: 15.6 % — HIGH (ref 10.3–14.5)
SODIUM SERPL-SCNC: 139 MMOL/L — SIGNIFICANT CHANGE UP (ref 135–145)
SP GR SPEC: 1.01 — SIGNIFICANT CHANGE UP (ref 1–1.03)
UROBILINOGEN FLD QL: 0.2 MG/DL — SIGNIFICANT CHANGE UP (ref 0.2–1)
WBC # BLD: 6.8 K/UL — SIGNIFICANT CHANGE UP (ref 3.8–10.5)
WBC # FLD AUTO: 6.8 K/UL — SIGNIFICANT CHANGE UP (ref 3.8–10.5)

## 2025-09-01 PROCEDURE — 99285 EMERGENCY DEPT VISIT HI MDM: CPT

## 2025-09-01 PROCEDURE — 74177 CT ABD & PELVIS W/CONTRAST: CPT | Mod: 26

## 2025-09-01 RX ORDER — ONDANSETRON HCL/PF 4 MG/2 ML
1 VIAL (ML) INJECTION
Qty: 9 | Refills: 0
Start: 2025-09-01 | End: 2025-09-03

## 2025-09-01 RX ORDER — ONDANSETRON HCL/PF 4 MG/2 ML
4 VIAL (ML) INJECTION ONCE
Refills: 0 | Status: COMPLETED | OUTPATIENT
Start: 2025-09-01 | End: 2025-09-01

## 2025-09-01 RX ADMIN — Medication 1000 MILLILITER(S): at 15:22

## 2025-09-01 RX ADMIN — Medication 4 MILLIGRAM(S): at 15:43

## 2025-09-03 ENCOUNTER — TRANSCRIPTION ENCOUNTER (OUTPATIENT)
Age: 70
End: 2025-09-03

## (undated) DEVICE — DRAPE CHEST BREAST 106X122"

## (undated) DEVICE — DRSG MASTISOL

## (undated) DEVICE — GLV 8 ULTRAFREE MAX

## (undated) DEVICE — STAPLER SKIN VISI-STAT 35 WIDE

## (undated) DEVICE — CONTAINER SPECIMEN 100ML

## (undated) DEVICE — ELCTR EDGE BOVIE INSULATED BLADE TIP 2.75"

## (undated) DEVICE — DRSG 4X4

## (undated) DEVICE — SUT POLYSORB 3-0 30" V-20 UNDYED

## (undated) DEVICE — WRAP COMPRESSION CALF LG

## (undated) DEVICE — POSITIONER PATIENT SAFETY STRAP 3X60"

## (undated) DEVICE — GLV 7.5 ULTRAFREE MAX

## (undated) DEVICE — LIGASURE SM JAW 16.5MM 18CM

## (undated) DEVICE — PACK MINOR

## (undated) DEVICE — DRSG MAMMARY SUPPORT LG SIZE 4

## (undated) DEVICE — PREP CHLORAPREP ORANGE 2PCT 26ML

## (undated) DEVICE — SUT MONOCRYL 3-0 18" PS-2 UNDYED

## (undated) DEVICE — WRAP COMPRESSION CALF BARIATRIC

## (undated) DEVICE — DRSG SURGICAL BRA W LOOPS FOR JP DRAIN 42-45"

## (undated) DEVICE — SUT POLYSORB 2-0 30" V-20 UNDYED

## (undated) DEVICE — DRAPE 3/4 SHEET 52X76"

## (undated) DEVICE — SOLIDIFIER CANN EXPRESS 3K

## (undated) DEVICE — DRAIN JACKSON PRATT 10MM FLAT FULL NO TROCAR

## (undated) DEVICE — CANISTER SUCTION LID GUARD 3000CC

## (undated) DEVICE — SUT MONOCRYL 4-0 27" PS-2 UNDYED

## (undated) DEVICE — WRAP COMPRESSION CALF MED

## (undated) DEVICE — POSITIONER FOAM HEAD CRADLE

## (undated) DEVICE — DRAPE TOWEL BLUE 17" X 24"

## (undated) DEVICE — SAVI SCOUT HANDPIECE

## (undated) DEVICE — GLV 7.5 PROTEXIS

## (undated) DEVICE — CONTAINER SPECIMEN PET

## (undated) DEVICE — DRAIN RESERVOIR FOR JACKSON PRATT 100CC CARDINAL

## (undated) DEVICE — DRAPE 3/4 SHEET W REINFORCEMENT 56X77"

## (undated) DEVICE — GLV 6.5 PROTEXIS

## (undated) DEVICE — GLV 6 PROTEXIS

## (undated) DEVICE — SUT POLYSORB 2-0 36" GS-21 UNDYED

## (undated) DEVICE — BLANKET WARMER LOWER ADULT

## (undated) DEVICE — SUT POLYSORB 4-0 30" C-13 UNDYED

## (undated) DEVICE — SPONGE LAP X-RAY DETECTABLE 18X18"

## (undated) DEVICE — SOL IRR POUR H2O 1500ML

## (undated) DEVICE — DRSG STERISTRIPS 0.25X4"

## (undated) DEVICE — DRSG COMBINE 5X9"

## (undated) DEVICE — POSITIONER STRAP ARMBOARD VELCRO TS-30 12

## (undated) DEVICE — DRSG STERISTRIPS 0.5X4"

## (undated) DEVICE — SOL IRR POUR NS 0.9% 500ML